# Patient Record
Sex: MALE | Race: WHITE | NOT HISPANIC OR LATINO | Employment: FULL TIME | URBAN - METROPOLITAN AREA
[De-identification: names, ages, dates, MRNs, and addresses within clinical notes are randomized per-mention and may not be internally consistent; named-entity substitution may affect disease eponyms.]

---

## 2023-10-12 DIAGNOSIS — F41.9 ANXIETY: Primary | ICD-10-CM

## 2023-10-12 RX ORDER — DULOXETIN HYDROCHLORIDE 60 MG/1
60 CAPSULE, DELAYED RELEASE ORAL DAILY
Qty: 90 CAPSULE | Refills: 1 | Status: SHIPPED | OUTPATIENT
Start: 2023-10-12 | End: 2024-04-02

## 2023-11-21 ENCOUNTER — TELEPHONE (OUTPATIENT)
Dept: PRIMARY CARE | Facility: CLINIC | Age: 63
End: 2023-11-21
Payer: COMMERCIAL

## 2023-12-05 DIAGNOSIS — I10 PRIMARY HYPERTENSION: ICD-10-CM

## 2023-12-05 DIAGNOSIS — Z13.5 DIABETIC RETINOPATHY SCREENING: Primary | ICD-10-CM

## 2023-12-05 DIAGNOSIS — M54.9 BACK PAIN, UNSPECIFIED BACK LOCATION, UNSPECIFIED BACK PAIN LATERALITY, UNSPECIFIED CHRONICITY: ICD-10-CM

## 2023-12-05 DIAGNOSIS — E78.2 MIXED HYPERLIPIDEMIA: ICD-10-CM

## 2023-12-05 RX ORDER — LISINOPRIL 40 MG/1
1 TABLET ORAL DAILY
COMMUNITY
Start: 2021-06-21 | End: 2023-12-05 | Stop reason: SDUPTHER

## 2023-12-05 RX ORDER — TIZANIDINE 4 MG/1
4 TABLET ORAL DAILY PRN
Qty: 90 TABLET | Refills: 1 | Status: SHIPPED | OUTPATIENT
Start: 2023-12-05 | End: 2024-06-02

## 2023-12-05 RX ORDER — LISINOPRIL 40 MG/1
40 TABLET ORAL DAILY
Qty: 90 TABLET | Refills: 1 | Status: SHIPPED | OUTPATIENT
Start: 2023-12-05 | End: 2024-05-17

## 2023-12-05 RX ORDER — GABAPENTIN 600 MG/1
1 TABLET ORAL 3 TIMES DAILY
COMMUNITY
Start: 2021-01-18 | End: 2023-12-05 | Stop reason: SDUPTHER

## 2023-12-05 RX ORDER — CHLORTHALIDONE 50 MG/1
50 TABLET ORAL DAILY
COMMUNITY
Start: 2022-07-13 | End: 2023-12-05 | Stop reason: SDUPTHER

## 2023-12-05 RX ORDER — TIZANIDINE 4 MG/1
2 TABLET ORAL DAILY PRN
COMMUNITY
Start: 2021-01-18 | End: 2023-12-05 | Stop reason: SDUPTHER

## 2023-12-05 RX ORDER — PRAVASTATIN SODIUM 20 MG/1
1 TABLET ORAL NIGHTLY
COMMUNITY
Start: 2021-07-15 | End: 2023-12-05 | Stop reason: SDUPTHER

## 2023-12-05 RX ORDER — CARVEDILOL 12.5 MG/1
1 TABLET ORAL
COMMUNITY
Start: 2021-10-20 | End: 2023-12-05 | Stop reason: SDUPTHER

## 2023-12-05 RX ORDER — GABAPENTIN 600 MG/1
600 TABLET ORAL 3 TIMES DAILY
Qty: 270 TABLET | Refills: 1 | Status: SHIPPED | OUTPATIENT
Start: 2023-12-05 | End: 2024-05-17

## 2023-12-05 RX ORDER — PRAVASTATIN SODIUM 20 MG/1
20 TABLET ORAL NIGHTLY
Qty: 90 TABLET | Refills: 1 | Status: SHIPPED | OUTPATIENT
Start: 2023-12-05 | End: 2023-12-19 | Stop reason: SDUPTHER

## 2023-12-05 RX ORDER — CHLORTHALIDONE 50 MG/1
50 TABLET ORAL DAILY
Qty: 90 TABLET | Refills: 1 | Status: SHIPPED | OUTPATIENT
Start: 2023-12-05 | End: 2024-05-17

## 2023-12-05 RX ORDER — CARVEDILOL 12.5 MG/1
12.5 TABLET ORAL
Qty: 180 TABLET | Refills: 1 | Status: SHIPPED | OUTPATIENT
Start: 2023-12-05 | End: 2024-05-17

## 2023-12-15 PROBLEM — F41.8 DEPRESSION WITH ANXIETY: Status: ACTIVE | Noted: 2023-12-15

## 2023-12-15 PROBLEM — R17 ELEVATED BILIRUBIN: Status: ACTIVE | Noted: 2023-12-15

## 2023-12-15 PROBLEM — R73.09 IMPAIRED GLUCOSE METABOLISM: Status: ACTIVE | Noted: 2023-12-15

## 2023-12-15 PROBLEM — M54.16 LUMBAR RADICULOPATHY, CHRONIC: Status: ACTIVE | Noted: 2023-12-15

## 2023-12-15 PROBLEM — L23.9 ALLERGIC CONTACT DERMATITIS: Status: ACTIVE | Noted: 2023-12-15

## 2023-12-15 PROBLEM — R35.1 NOCTURIA: Status: ACTIVE | Noted: 2023-12-15

## 2023-12-15 PROBLEM — T78.40XA ALLERGIES: Status: ACTIVE | Noted: 2023-12-15

## 2023-12-15 PROBLEM — I10 HTN (HYPERTENSION): Status: ACTIVE | Noted: 2023-12-15

## 2023-12-15 PROBLEM — E66.9 OBESE: Status: ACTIVE | Noted: 2023-12-15

## 2023-12-15 PROBLEM — E78.5 HYPERLIPIDEMIA: Status: ACTIVE | Noted: 2023-12-15

## 2023-12-15 PROBLEM — K21.9 GERD (GASTROESOPHAGEAL REFLUX DISEASE): Status: ACTIVE | Noted: 2023-12-15

## 2023-12-15 PROBLEM — M79.673 FOOT PAIN: Status: ACTIVE | Noted: 2023-12-15

## 2023-12-15 PROBLEM — R73.9 HYPERGLYCEMIA: Status: ACTIVE | Noted: 2023-12-15

## 2023-12-15 PROBLEM — K76.0 FATTY LIVER: Status: ACTIVE | Noted: 2023-12-15

## 2023-12-15 PROBLEM — R53.82 CHRONIC FATIGUE: Status: ACTIVE | Noted: 2023-12-15

## 2023-12-19 ENCOUNTER — OFFICE VISIT (OUTPATIENT)
Dept: PRIMARY CARE | Facility: CLINIC | Age: 63
End: 2023-12-19
Payer: COMMERCIAL

## 2023-12-19 VITALS
OXYGEN SATURATION: 96 % | HEIGHT: 75 IN | SYSTOLIC BLOOD PRESSURE: 118 MMHG | HEART RATE: 55 BPM | DIASTOLIC BLOOD PRESSURE: 70 MMHG | BODY MASS INDEX: 35.61 KG/M2 | WEIGHT: 286.4 LBS

## 2023-12-19 DIAGNOSIS — E55.9 VITAMIN D DEFICIENCY: ICD-10-CM

## 2023-12-19 DIAGNOSIS — I10 PRIMARY HYPERTENSION: Primary | ICD-10-CM

## 2023-12-19 DIAGNOSIS — E78.2 MIXED HYPERLIPIDEMIA: ICD-10-CM

## 2023-12-19 DIAGNOSIS — Z12.5 SCREENING FOR PROSTATE CANCER: ICD-10-CM

## 2023-12-19 DIAGNOSIS — R73.09 IMPAIRED GLUCOSE METABOLISM: ICD-10-CM

## 2023-12-19 PROBLEM — M51.36 DDD (DEGENERATIVE DISC DISEASE), LUMBAR: Status: ACTIVE | Noted: 2017-08-09

## 2023-12-19 PROBLEM — M51.16 INTERVERTEBRAL DISC DISORDER WITH RADICULOPATHY OF LUMBAR REGION: Status: ACTIVE | Noted: 2017-08-09

## 2023-12-19 PROBLEM — K64.9 HEMORRHOIDS: Status: ACTIVE | Noted: 2023-12-19

## 2023-12-19 PROBLEM — R79.89 ABNORMAL LIVER FUNCTION TESTS: Status: ACTIVE | Noted: 2023-12-19

## 2023-12-19 PROBLEM — M51.369 DDD (DEGENERATIVE DISC DISEASE), LUMBAR: Status: ACTIVE | Noted: 2017-08-09

## 2023-12-19 PROCEDURE — 3078F DIAST BP <80 MM HG: CPT | Performed by: STUDENT IN AN ORGANIZED HEALTH CARE EDUCATION/TRAINING PROGRAM

## 2023-12-19 PROCEDURE — 1036F TOBACCO NON-USER: CPT | Performed by: STUDENT IN AN ORGANIZED HEALTH CARE EDUCATION/TRAINING PROGRAM

## 2023-12-19 PROCEDURE — 3074F SYST BP LT 130 MM HG: CPT | Performed by: STUDENT IN AN ORGANIZED HEALTH CARE EDUCATION/TRAINING PROGRAM

## 2023-12-19 PROCEDURE — 99214 OFFICE O/P EST MOD 30 MIN: CPT | Performed by: STUDENT IN AN ORGANIZED HEALTH CARE EDUCATION/TRAINING PROGRAM

## 2023-12-19 RX ORDER — CETIRIZINE HYDROCHLORIDE 10 MG/1
1 TABLET ORAL DAILY
COMMUNITY
Start: 2021-10-20

## 2023-12-19 RX ORDER — ASPIRIN 81 MG/1
1 TABLET ORAL DAILY
COMMUNITY
Start: 2021-12-27

## 2023-12-19 RX ORDER — PRAVASTATIN SODIUM 20 MG/1
20 TABLET ORAL NIGHTLY
Qty: 90 TABLET | Refills: 1 | Status: SHIPPED | OUTPATIENT
Start: 2023-12-19 | End: 2024-04-04

## 2023-12-19 RX ORDER — BETAMETHASONE VALERATE 1 MG/G
CREAM TOPICAL 2 TIMES DAILY
COMMUNITY
Start: 2022-08-03

## 2023-12-19 ASSESSMENT — ENCOUNTER SYMPTOMS
SHORTNESS OF BREATH: 0
NAUSEA: 0
CONSTIPATION: 0
WHEEZING: 0
VOMITING: 0
DIZZINESS: 0
DYSURIA: 0
CHILLS: 0
PALPITATIONS: 0
JOINT SWELLING: 0
ABDOMINAL PAIN: 0
RHINORRHEA: 0
ARTHRALGIAS: 0
COUGH: 0
FEVER: 0
DIARRHEA: 0
FATIGUE: 0
FACIAL SWELLING: 0
SORE THROAT: 0
FREQUENCY: 0

## 2023-12-19 NOTE — PROGRESS NOTES
"Subjective   Patient ID: John Rojas is a 63 y.o. male who presents for Follow-up.    HPI   Patient is here for follow-up of hypertension, hyperlipidemia, impaired glucose metabolism, depression anxiety.  Overall is been feeling quite well.  Blood pressure well-controlled today.  Denies any chest pain, shortness breath, headaches.  Denies any adverse reaction with his medication regimen.  Mood has been stable.  Denies any thoughts of hurting self or others.  Due for blood work.  Needs refills of medications.  No other concerns today.    Review of Systems   Constitutional:  Negative for chills, fatigue and fever.   HENT:  Negative for congestion, ear pain, facial swelling, hearing loss, rhinorrhea and sore throat.    Respiratory:  Negative for cough, shortness of breath and wheezing.    Cardiovascular:  Negative for chest pain and palpitations.   Gastrointestinal:  Negative for abdominal pain, constipation, diarrhea, nausea and vomiting.   Genitourinary:  Negative for dysuria and frequency.   Musculoskeletal:  Negative for arthralgias and joint swelling.   Skin:  Negative for rash.   Neurological:  Negative for dizziness and syncope.       Objective   /70   Pulse 55   Ht 1.905 m (6' 3\")   Wt 130 kg (286 lb 6.4 oz)   SpO2 96%   BMI 35.80 kg/m²     Physical Exam  HENT:      Head: Normocephalic and atraumatic.      Right Ear: Tympanic membrane, ear canal and external ear normal.      Left Ear: Tympanic membrane, ear canal and external ear normal.      Nose: Nose normal.      Mouth/Throat:      Mouth: Mucous membranes are moist.      Pharynx: Oropharynx is clear. No posterior oropharyngeal erythema.   Eyes:      Conjunctiva/sclera: Conjunctivae normal.   Cardiovascular:      Rate and Rhythm: Normal rate and regular rhythm.      Pulses: Normal pulses.   Pulmonary:      Effort: Pulmonary effort is normal.      Breath sounds: Normal breath sounds.   Abdominal:      General: Abdomen is flat.      Palpations: " Abdomen is soft.   Skin:     General: Skin is warm and dry.   Neurological:      General: No focal deficit present.      Mental Status: He is alert.   Psychiatric:         Mood and Affect: Mood normal.         Behavior: Behavior normal.         Thought Content: Thought content normal.         Judgment: Judgment normal.       Assessment/Plan   Continue current medication regimen.  No changes point in time.  Follow-up in 6 months for chronic condition review.  Blood work has been ordered.

## 2024-04-02 ENCOUNTER — OFFICE VISIT (OUTPATIENT)
Dept: PRIMARY CARE | Facility: CLINIC | Age: 64
End: 2024-04-02
Payer: COMMERCIAL

## 2024-04-02 VITALS
BODY MASS INDEX: 36.18 KG/M2 | WEIGHT: 291 LBS | SYSTOLIC BLOOD PRESSURE: 122 MMHG | HEART RATE: 64 BPM | OXYGEN SATURATION: 94 % | DIASTOLIC BLOOD PRESSURE: 84 MMHG | HEIGHT: 75 IN

## 2024-04-02 DIAGNOSIS — Z00.00 ANNUAL PHYSICAL EXAM: Primary | ICD-10-CM

## 2024-04-02 DIAGNOSIS — E66.01 MORBID OBESITY DUE TO EXCESS CALORIES (MULTI): ICD-10-CM

## 2024-04-02 DIAGNOSIS — F41.9 ANXIETY: ICD-10-CM

## 2024-04-02 DIAGNOSIS — Z12.5 SCREENING FOR PROSTATE CANCER: ICD-10-CM

## 2024-04-02 DIAGNOSIS — I10 PRIMARY HYPERTENSION: ICD-10-CM

## 2024-04-02 DIAGNOSIS — E78.2 MIXED HYPERLIPIDEMIA: ICD-10-CM

## 2024-04-02 DIAGNOSIS — Z23 ENCOUNTER FOR IMMUNIZATION: ICD-10-CM

## 2024-04-02 PROCEDURE — G0442 ANNUAL ALCOHOL SCREEN 15 MIN: HCPCS | Performed by: STUDENT IN AN ORGANIZED HEALTH CARE EDUCATION/TRAINING PROGRAM

## 2024-04-02 PROCEDURE — 99396 PREV VISIT EST AGE 40-64: CPT | Performed by: STUDENT IN AN ORGANIZED HEALTH CARE EDUCATION/TRAINING PROGRAM

## 2024-04-02 PROCEDURE — 99214 OFFICE O/P EST MOD 30 MIN: CPT | Performed by: STUDENT IN AN ORGANIZED HEALTH CARE EDUCATION/TRAINING PROGRAM

## 2024-04-02 PROCEDURE — G0444 DEPRESSION SCREEN ANNUAL: HCPCS | Performed by: STUDENT IN AN ORGANIZED HEALTH CARE EDUCATION/TRAINING PROGRAM

## 2024-04-02 PROCEDURE — 3079F DIAST BP 80-89 MM HG: CPT | Performed by: STUDENT IN AN ORGANIZED HEALTH CARE EDUCATION/TRAINING PROGRAM

## 2024-04-02 PROCEDURE — 3074F SYST BP LT 130 MM HG: CPT | Performed by: STUDENT IN AN ORGANIZED HEALTH CARE EDUCATION/TRAINING PROGRAM

## 2024-04-02 RX ORDER — DULOXETIN HYDROCHLORIDE 60 MG/1
60 CAPSULE, DELAYED RELEASE ORAL
Qty: 90 CAPSULE | Refills: 1 | Status: SHIPPED | OUTPATIENT
Start: 2024-04-02 | End: 2024-09-29

## 2024-04-02 RX ORDER — DULOXETIN HYDROCHLORIDE 30 MG/1
30 CAPSULE, DELAYED RELEASE ORAL EVERY EVENING
Qty: 30 CAPSULE | Refills: 5 | Status: SHIPPED | OUTPATIENT
Start: 2024-04-02 | End: 2024-09-29

## 2024-04-02 NOTE — PROGRESS NOTES
Subjective   Patient ID: John Rojas is a 63 y.o. male who presents for the following    Assessment/Plan   Preventative Medicine  -UTD on flu vaccine and initial covid vaccine.   -Will give Tdap vaccine tomorrow.  -Colonoscopy: last done 10 years ago. Repeat recommended to be done in 2022, but pt didn't get it done. Will call Dr Peoples to schedule.   -PSA to be ordered   -Lipid panel ordered   -PHQ2: 0  -Alcohol screening done     #HTN  -BP stable  -Continue low salt diet   -Continue chlorthalidone and lisinopril     #HLD  -Continue pravastatin  -Lipid panel ordered  -Low fat diet   #Neuropathy  #Lumbar Radiculopathy    #YUDY/MDD  -States that he still sometimes feel like he has low mood.   -No SI/HI  PLAN  -Increase cymbalta to 60mg PO qAM and 30mg PO qPM        #NAFLD  -Confirmed by liver bx in 2021   -Low fat diet recommended   -Exercise as tolerated       HPI  63M presents for new patient evaluation.     He eats a relatively unhealthy diet. Eats a primarily meat based diet. Advised to incorporate veggies and fruits into his diet. Does not exercise due to chronic low back. Advised to stay active as much as possible.     Discussed MDD/YUDY. Cymbalta works, but he states that he still has some MDD/YUDY symptoms during the day. No SI/HI. POC discussed. Pt agreeable.     Has a hx of NAFLD and HLD. Advised to adhere to statin and attempt to decrease fatty food consumption. He will try.     BP is well controlled and he is compliant with home meds.     Denies fevers, chills, weight loss, lightheadedness, dizziness, vision changes, sore throat, runny nose, CP, SOB, cough, palpitations, n/v/d, abd pain, black/bloody stools, arthralgias, or new numbness/weakness/tingling in arms/legs/face.        PMH: HTN, YUDY/MDD, NAFLD, Neuropathy, chronic lumbar radiculopathy   Surgical Hx: CCY    Social  T: denies  A: denies  D: denies     Occupation  -Works at [employer]  -      Visit Vitals  /84   Pulse 64   Ht  "1.905 m (6' 3\")   Wt 132 kg (291 lb)   SpO2 94%   BMI 36.37 kg/m²   Smoking Status Never   BSA 2.64 m²     PHYSICAL EXAM   Physical Exam     Visit Vitals  /84   Pulse 64   Ht 1.905 m (6' 3\")   Wt 132 kg (291 lb)   SpO2 94%   BMI 36.37 kg/m²   Smoking Status Never   BSA 2.64 m²        General: NAD. NCAT. Aox3   HEENT: PERRLA. EOMI. MMM. Nares patent bl.  Cardiovascular: RRR. No MRG. S1/S2 wnl.   Respiratory: CTABL. No acute respiratory distress.   GI: Soft, NT abdomen.   MSK: ROM x 4. Chronic lumbar paraspinal tenderness.   Extremities: BLLE trace non-pitting edema. Cap refill < 2 sec.   Skin: No rashes or bruises.   Neuro: Aox3. Cranial Nerves grossly intact. Motor/sensory wnl. Chronic BLLE neuropathy.   Psych: Mood wnl.       REVIEW OF SYSTEMS   ROS In HPI     Allergies   Allergen Reactions   • Peanut Unknown       Current Outpatient Medications   Medication Sig Dispense Refill   • aspirin 81 mg EC tablet Take 1 tablet (81 mg) by mouth once daily.     • betamethasone valerate (Valisone) 0.1 % cream twice a day.     • carvedilol (Coreg) 12.5 mg tablet Take 1 tablet (12.5 mg) by mouth 2 times a day with meals. 180 tablet 1   • cetirizine (ZyrTEC) 10 mg tablet Take 1 tablet (10 mg) by mouth once daily.     • chlorthalidone (Hygroton) 50 mg tablet Take 1 tablet (50 mg) by mouth once daily. 90 tablet 1   • DULoxetine (Cymbalta) 60 mg DR capsule Take 1 capsule (60 mg) by mouth once daily. Do not crush or chew. 90 capsule 1   • gabapentin (Neurontin) 600 mg tablet Take 1 tablet (600 mg) by mouth 3 times a day. 270 tablet 1   • lisinopril 40 mg tablet Take 1 tablet (40 mg) by mouth once daily. 90 tablet 1   • multivitamin with minerals tablet Take 1 tablet by mouth once daily.     • pravastatin (Pravachol) 20 mg tablet Take 1 tablet (20 mg) by mouth once daily at bedtime. 90 tablet 1   • tiZANidine (Zanaflex) 4 mg tablet Take 1 tablet (4 mg) by mouth once daily as needed for muscle spasms. 90 tablet 1     No current " facility-administered medications for this visit.       Objective     No visits with results within 4 Month(s) from this visit.   Latest known visit with results is:   No results found for any previous visit.       Radiology: Reviewed imaging in powerchart.  No results found.    Family History   Problem Relation Name Age of Onset   • No Known Problems Mother       Social History     Socioeconomic History   • Marital status:      Spouse name: None   • Number of children: None   • Years of education: None   • Highest education level: None   Occupational History   • None   Tobacco Use   • Smoking status: Never     Passive exposure: Never   • Smokeless tobacco: Never   Vaping Use   • Vaping Use: Never used   Substance and Sexual Activity   • Alcohol use: Not Currently   • Drug use: Never   • Sexual activity: None   Other Topics Concern   • None   Social History Narrative   • None     Social Determinants of Health     Financial Resource Strain: Not on file   Food Insecurity: Not on file   Transportation Needs: Not on file   Physical Activity: Not on file   Stress: Not on file   Social Connections: Not on file   Intimate Partner Violence: Not on file   Housing Stability: Not on file     No past medical history on file.  Past Surgical History:   Procedure Laterality Date   • OTHER SURGICAL HISTORY  12/06/2021    Gallbladder surgery   • OTHER SURGICAL HISTORY  12/06/2021    Colonoscopy       Charting was completed using voice recognition technology and may include unintended errors.

## 2024-04-03 ENCOUNTER — LAB (OUTPATIENT)
Dept: LAB | Facility: LAB | Age: 64
End: 2024-04-03
Payer: COMMERCIAL

## 2024-04-03 DIAGNOSIS — E78.2 MIXED HYPERLIPIDEMIA: ICD-10-CM

## 2024-04-03 DIAGNOSIS — I10 PRIMARY HYPERTENSION: ICD-10-CM

## 2024-04-03 DIAGNOSIS — Z12.5 SCREENING FOR PROSTATE CANCER: ICD-10-CM

## 2024-04-03 DIAGNOSIS — F41.9 ANXIETY: ICD-10-CM

## 2024-04-03 DIAGNOSIS — E66.01 MORBID OBESITY DUE TO EXCESS CALORIES (MULTI): ICD-10-CM

## 2024-04-03 LAB
ALBUMIN SERPL BCP-MCNC: 4.6 G/DL (ref 3.4–5)
ALP SERPL-CCNC: 51 U/L (ref 33–136)
ALT SERPL W P-5'-P-CCNC: 85 U/L (ref 10–52)
ANION GAP SERPL CALC-SCNC: 13 MMOL/L (ref 10–20)
AST SERPL W P-5'-P-CCNC: 61 U/L (ref 9–39)
BASOPHILS # BLD AUTO: 0.04 X10*3/UL (ref 0–0.1)
BASOPHILS NFR BLD AUTO: 0.6 %
BILIRUB SERPL-MCNC: 1.3 MG/DL (ref 0–1.2)
BUN SERPL-MCNC: 23 MG/DL (ref 6–23)
CALCIUM SERPL-MCNC: 9.8 MG/DL (ref 8.6–10.6)
CHLORIDE SERPL-SCNC: 100 MMOL/L (ref 98–107)
CHOLEST SERPL-MCNC: 190 MG/DL (ref 0–199)
CHOLESTEROL/HDL RATIO: 5.3
CO2 SERPL-SCNC: 31 MMOL/L (ref 21–32)
CREAT SERPL-MCNC: 1.56 MG/DL (ref 0.5–1.3)
EGFRCR SERPLBLD CKD-EPI 2021: 50 ML/MIN/1.73M*2
EOSINOPHIL # BLD AUTO: 0.15 X10*3/UL (ref 0–0.7)
EOSINOPHIL NFR BLD AUTO: 2.3 %
ERYTHROCYTE [DISTWIDTH] IN BLOOD BY AUTOMATED COUNT: 12.8 % (ref 11.5–14.5)
GLUCOSE SERPL-MCNC: 155 MG/DL (ref 74–99)
HCT VFR BLD AUTO: 43.3 % (ref 41–52)
HDLC SERPL-MCNC: 35.9 MG/DL
HGB BLD-MCNC: 14.7 G/DL (ref 13.5–17.5)
IMM GRANULOCYTES # BLD AUTO: 0.01 X10*3/UL (ref 0–0.7)
IMM GRANULOCYTES NFR BLD AUTO: 0.2 % (ref 0–0.9)
LDLC SERPL CALC-MCNC: ABNORMAL MG/DL
LYMPHOCYTES # BLD AUTO: 3.16 X10*3/UL (ref 1.2–4.8)
LYMPHOCYTES NFR BLD AUTO: 48.5 %
MCH RBC QN AUTO: 28.7 PG (ref 26–34)
MCHC RBC AUTO-ENTMCNC: 33.9 G/DL (ref 32–36)
MCV RBC AUTO: 84 FL (ref 80–100)
MONOCYTES # BLD AUTO: 0.43 X10*3/UL (ref 0.1–1)
MONOCYTES NFR BLD AUTO: 6.6 %
NEUTROPHILS # BLD AUTO: 2.72 X10*3/UL (ref 1.2–7.7)
NEUTROPHILS NFR BLD AUTO: 41.8 %
NON HDL CHOLESTEROL: 154 MG/DL (ref 0–149)
NRBC BLD-RTO: 0 /100 WBCS (ref 0–0)
PLATELET # BLD AUTO: 211 X10*3/UL (ref 150–450)
POTASSIUM SERPL-SCNC: 3.6 MMOL/L (ref 3.5–5.3)
PROT SERPL-MCNC: 7.5 G/DL (ref 6.4–8.2)
PSA SERPL-MCNC: 0.49 NG/ML
RBC # BLD AUTO: 5.13 X10*6/UL (ref 4.5–5.9)
SODIUM SERPL-SCNC: 140 MMOL/L (ref 136–145)
TRIGL SERPL-MCNC: 456 MG/DL (ref 0–149)
TSH SERPL-ACNC: 2.93 MIU/L (ref 0.44–3.98)
VLDL: ABNORMAL
WBC # BLD AUTO: 6.5 X10*3/UL (ref 4.4–11.3)

## 2024-04-03 PROCEDURE — 80053 COMPREHEN METABOLIC PANEL: CPT

## 2024-04-03 PROCEDURE — 85025 COMPLETE CBC W/AUTO DIFF WBC: CPT

## 2024-04-03 PROCEDURE — 84443 ASSAY THYROID STIM HORMONE: CPT

## 2024-04-03 PROCEDURE — 36415 COLL VENOUS BLD VENIPUNCTURE: CPT

## 2024-04-03 PROCEDURE — 80061 LIPID PANEL: CPT

## 2024-04-03 PROCEDURE — 84153 ASSAY OF PSA TOTAL: CPT

## 2024-04-04 ENCOUNTER — TELEPHONE (OUTPATIENT)
Dept: PRIMARY CARE | Facility: CLINIC | Age: 64
End: 2024-04-04
Payer: COMMERCIAL

## 2024-04-04 RX ORDER — ATORVASTATIN CALCIUM 40 MG/1
40 TABLET, FILM COATED ORAL DAILY
Qty: 100 TABLET | Refills: 3 | Status: SHIPPED | OUTPATIENT
Start: 2024-04-04 | End: 2024-04-16

## 2024-04-04 NOTE — TELEPHONE ENCOUNTER
----- Message from Lori Haney MD sent at 4/4/2024 11:05 AM EDT -----  Has chronic kidney disease. Needs to control BP. Low salt diet recommended and avoid nephrotoxic medications such as ibuprofen, naproxen, full dose aspirin    Has Fatty liver disease. Labs seem stable compared to 2022.     Cholesterol is uncontrolled. Will discontinue pravastatin and start Atorvastatin 40mg PO daily. Needs to improve his diet by cutting down fats and carbs. Exercise at least 3x weekly.     Advise patient to have lipids rechecked with us in 6 months.

## 2024-04-10 NOTE — TELEPHONE ENCOUNTER
Spoke to pt - he was informed of results. There was no note stating this in chart. Patient will talk to provider on 4/16/24.

## 2024-04-16 ENCOUNTER — OFFICE VISIT (OUTPATIENT)
Dept: PRIMARY CARE | Facility: CLINIC | Age: 64
End: 2024-04-16
Payer: COMMERCIAL

## 2024-04-16 ENCOUNTER — LAB (OUTPATIENT)
Dept: LAB | Facility: LAB | Age: 64
End: 2024-04-16
Payer: COMMERCIAL

## 2024-04-16 VITALS
OXYGEN SATURATION: 94 % | HEART RATE: 62 BPM | WEIGHT: 284 LBS | HEIGHT: 75 IN | DIASTOLIC BLOOD PRESSURE: 82 MMHG | SYSTOLIC BLOOD PRESSURE: 124 MMHG | BODY MASS INDEX: 35.31 KG/M2

## 2024-04-16 DIAGNOSIS — E78.2 MIXED HYPERLIPIDEMIA: Primary | ICD-10-CM

## 2024-04-16 DIAGNOSIS — R73.9 HYPERGLYCEMIA: ICD-10-CM

## 2024-04-16 LAB
EST. AVERAGE GLUCOSE BLD GHB EST-MCNC: 160 MG/DL
HBA1C MFR BLD: 7.2 %

## 2024-04-16 PROCEDURE — 99214 OFFICE O/P EST MOD 30 MIN: CPT | Performed by: STUDENT IN AN ORGANIZED HEALTH CARE EDUCATION/TRAINING PROGRAM

## 2024-04-16 PROCEDURE — 1036F TOBACCO NON-USER: CPT | Performed by: STUDENT IN AN ORGANIZED HEALTH CARE EDUCATION/TRAINING PROGRAM

## 2024-04-16 PROCEDURE — 83036 HEMOGLOBIN GLYCOSYLATED A1C: CPT

## 2024-04-16 PROCEDURE — 3074F SYST BP LT 130 MM HG: CPT | Performed by: STUDENT IN AN ORGANIZED HEALTH CARE EDUCATION/TRAINING PROGRAM

## 2024-04-16 PROCEDURE — 3079F DIAST BP 80-89 MM HG: CPT | Performed by: STUDENT IN AN ORGANIZED HEALTH CARE EDUCATION/TRAINING PROGRAM

## 2024-04-16 PROCEDURE — 36415 COLL VENOUS BLD VENIPUNCTURE: CPT

## 2024-04-16 RX ORDER — ATORVASTATIN CALCIUM 80 MG/1
80 TABLET, FILM COATED ORAL DAILY
Qty: 100 TABLET | Refills: 3 | Status: SHIPPED | OUTPATIENT
Start: 2024-04-16 | End: 2025-05-21

## 2024-04-16 NOTE — PROGRESS NOTES
Subjective   Patient ID: John Rojas is a 63 y.o. male who presents for the follow up given recent establishment of care visit 4/2 to review baseline labs and efficacy of his medication regiment change.    Assessment/Plan     Preventative Medicine  -UTD on flu vaccine and initial covid vaccine.   -Will give Tdap vaccine tomorrow.  -Colonoscopy: last done 10 years ago. Repeat recommended to be done in 2022, but pt didn't get it done. Will call Dr ePoples to schedule.   -PSA reviewed 0.49  -PHQ2: 0  -Alcohol screening done     #Hyperglycemia   -Serum glucose on   -Ordered A1c to better assess for DM    #HTN, essential  -BP stable noting in clinic today 124/82  -Continue low salt diet   -Continue chlorthalidone and lisinopril     #HLD  -Discontinued pravastatin on 4/2/2024  -Initiated 40 mg atorvastatin but given patient's lipid panel results will increase his atorvastatin dosing to 80 mg daily.  -Low fat diet       #Neuropathy  #Lumbar Radiculopathy  -Stable     #YUDY/MDD  -States that he still sometimes feel like he has low mood.   -No SI/HI  PLAN  -Continue cymbalta 60mg PO qAM and 30mg PO qPM    #CKD stage IIIa  -sCr 1.5  -eGRR of 50  -Avoid nephrotoxic agents most concerning given patients complaint of chronic back pain educated on the avoidance of NSAIDs    #NAFLD  -Confirmed by liver bx in 2021   -Low fat diet recommended   -Exercise as tolerated       HPI  63-year-old male presenting in follow-up from an establishment of care visit to review baseline labs and assess his tolerance of recent medication changes noting an increase to Cymbalta and transition to atorvastatin discontinuing his chronic pravastatin.  Patient endorses improvement in his MDD symptoms with the increase in Cymbalta denies any HI or SI.  Denies any chest pain, worsening shortness of breath, cough, abdominal pain, nausea, vomiting, diarrhea, fevers or chills and endorses that he is tolerating his medications as prescribed without  "issue.      PMH: HTN, YUDY/MDD, NAFLD, Neuropathy, chronic lumbar radiculopathy   Surgical Hx: CCY    Social  T: denies  A: denies  D: denies     Occupation  -Works at motor repair shop  -      Visit Vitals  /82   Pulse 62   Ht 1.905 m (6' 3\")   Wt 129 kg (284 lb)   SpO2 94%   BMI 35.50 kg/m²   Smoking Status Never   BSA 2.61 m²     PHYSICAL EXAM   Physical Exam     Visit Vitals  /82   Pulse 62   Ht 1.905 m (6' 3\")   Wt 129 kg (284 lb)   SpO2 94%   BMI 35.50 kg/m²   Smoking Status Never   BSA 2.61 m²        General: NAD. NCAT. Aox3   HEENT: PERRLA. EOMI. MMM. Nares patent bl.  Cardiovascular: RRR. No MRG. S1/S2 wnl.   Respiratory: CTABL. No acute respiratory distress.   GI: Soft, NT abdomen.   MSK: ROM x 4. Chronic lumbar paraspinal tenderness.   Extremities: BLLE trace non-pitting edema. Cap refill < 2 sec.   Skin: No rashes or bruises.   Neuro: Aox3. Cranial Nerves grossly intact. Motor/sensory wnl. Chronic BLLE neuropathy.   Psych: Mood wnl.       REVIEW OF SYSTEMS   ROS In HPI     Allergies   Allergen Reactions    Peanut Unknown       Current Outpatient Medications   Medication Sig Dispense Refill    aspirin 81 mg EC tablet Take 1 tablet (81 mg) by mouth once daily.      atorvastatin (Lipitor) 40 mg tablet Take 1 tablet (40 mg) by mouth once daily. 100 tablet 3    betamethasone valerate (Valisone) 0.1 % cream twice a day.      carvedilol (Coreg) 12.5 mg tablet Take 1 tablet (12.5 mg) by mouth 2 times a day with meals. 180 tablet 1    cetirizine (ZyrTEC) 10 mg tablet Take 1 tablet (10 mg) by mouth once daily.      chlorthalidone (Hygroton) 50 mg tablet Take 1 tablet (50 mg) by mouth once daily. 90 tablet 1    DULoxetine (Cymbalta) 30 mg DR capsule Take 1 capsule (30 mg) by mouth once daily in the evening. Do not crush or chew. 30 capsule 5    DULoxetine (Cymbalta) 60 mg DR capsule Take 1 capsule (60 mg) by mouth once daily in the morning. Take before meals. Do not crush or chew. 90 capsule 1    " gabapentin (Neurontin) 600 mg tablet Take 1 tablet (600 mg) by mouth 3 times a day. 270 tablet 1    lisinopril 40 mg tablet Take 1 tablet (40 mg) by mouth once daily. 90 tablet 1    multivitamin with minerals tablet Take 1 tablet by mouth once daily.      tiZANidine (Zanaflex) 4 mg tablet Take 1 tablet (4 mg) by mouth once daily as needed for muscle spasms. 90 tablet 1     No current facility-administered medications for this visit.       Objective     Lab on 04/03/2024   Component Date Value Ref Range Status    Glucose 04/03/2024 155 (H)  74 - 99 mg/dL Final    Sodium 04/03/2024 140  136 - 145 mmol/L Final    Potassium 04/03/2024 3.6  3.5 - 5.3 mmol/L Final    Chloride 04/03/2024 100  98 - 107 mmol/L Final    Bicarbonate 04/03/2024 31  21 - 32 mmol/L Final    Anion Gap 04/03/2024 13  10 - 20 mmol/L Final    Urea Nitrogen 04/03/2024 23  6 - 23 mg/dL Final    Creatinine 04/03/2024 1.56 (H)  0.50 - 1.30 mg/dL Final    eGFR 04/03/2024 50 (L)  >60 mL/min/1.73m*2 Final    Calcium 04/03/2024 9.8  8.6 - 10.6 mg/dL Final    Albumin 04/03/2024 4.6  3.4 - 5.0 g/dL Final    Alkaline Phosphatase 04/03/2024 51  33 - 136 U/L Final    Total Protein 04/03/2024 7.5  6.4 - 8.2 g/dL Final    AST 04/03/2024 61 (H)  9 - 39 U/L Final    Bilirubin, Total 04/03/2024 1.3 (H)  0.0 - 1.2 mg/dL Final    ALT 04/03/2024 85 (H)  10 - 52 U/L Final    WBC 04/03/2024 6.5  4.4 - 11.3 x10*3/uL Final    nRBC 04/03/2024 0.0  0.0 - 0.0 /100 WBCs Final    RBC 04/03/2024 5.13  4.50 - 5.90 x10*6/uL Final    Hemoglobin 04/03/2024 14.7  13.5 - 17.5 g/dL Final    Hematocrit 04/03/2024 43.3  41.0 - 52.0 % Final    MCV 04/03/2024 84  80 - 100 fL Final    MCH 04/03/2024 28.7  26.0 - 34.0 pg Final    MCHC 04/03/2024 33.9  32.0 - 36.0 g/dL Final    RDW 04/03/2024 12.8  11.5 - 14.5 % Final    Platelets 04/03/2024 211  150 - 450 x10*3/uL Final    Neutrophils % 04/03/2024 41.8  40.0 - 80.0 % Final    Immature Granulocytes %, Automated 04/03/2024 0.2  0.0 - 0.9 % Final     Lymphocytes % 04/03/2024 48.5  13.0 - 44.0 % Final    Monocytes % 04/03/2024 6.6  2.0 - 10.0 % Final    Eosinophils % 04/03/2024 2.3  0.0 - 6.0 % Final    Basophils % 04/03/2024 0.6  0.0 - 2.0 % Final    Neutrophils Absolute 04/03/2024 2.72  1.20 - 7.70 x10*3/uL Final    Immature Granulocytes Absolute, Au* 04/03/2024 0.01  0.00 - 0.70 x10*3/uL Final    Lymphocytes Absolute 04/03/2024 3.16  1.20 - 4.80 x10*3/uL Final    Monocytes Absolute 04/03/2024 0.43  0.10 - 1.00 x10*3/uL Final    Eosinophils Absolute 04/03/2024 0.15  0.00 - 0.70 x10*3/uL Final    Basophils Absolute 04/03/2024 0.04  0.00 - 0.10 x10*3/uL Final    Cholesterol 04/03/2024 190  0 - 199 mg/dL Final    HDL-Cholesterol 04/03/2024 35.9  mg/dL Final    Cholesterol/HDL Ratio 04/03/2024 5.3   Final    LDL Calculated 04/03/2024    Final    VLDL 04/03/2024    Final    Triglycerides 04/03/2024 456 (H)  0 - 149 mg/dL Final    Non HDL Cholesterol 04/03/2024 154 (H)  0 - 149 mg/dL Final    Thyroid Stimulating Hormone 04/03/2024 2.93  0.44 - 3.98 mIU/L Final    Prostate Specific Antigen,Screen 04/03/2024 0.49  <=4.00 ng/mL Final       Radiology: Reviewed imaging in powerchart.  No results found.    Family History   Problem Relation Name Age of Onset    No Known Problems Mother       Social History     Socioeconomic History    Marital status:      Spouse name: None    Number of children: None    Years of education: None    Highest education level: None   Occupational History    None   Tobacco Use    Smoking status: Never     Passive exposure: Never    Smokeless tobacco: Never   Vaping Use    Vaping status: Never Used   Substance and Sexual Activity    Alcohol use: Not Currently    Drug use: Never    Sexual activity: None   Other Topics Concern    None   Social History Narrative    None     Social Determinants of Health     Financial Resource Strain: Not on file   Food Insecurity: Not on file   Transportation Needs: Not on file   Physical Activity: Not on  file   Stress: Not on file   Social Connections: Not on file   Intimate Partner Violence: Not on file   Housing Stability: Not on file     No past medical history on file.  Past Surgical History:   Procedure Laterality Date    OTHER SURGICAL HISTORY  12/06/2021    Gallbladder surgery    OTHER SURGICAL HISTORY  12/06/2021    Colonoscopy       Charting was completed using voice recognition technology and may include unintended errors.       Assessment and plan discussed with attending physician     Rehan Jimenes, DO  Internal Medicine, PGY-2

## 2024-04-17 ENCOUNTER — TELEPHONE (OUTPATIENT)
Dept: PRIMARY CARE | Facility: CLINIC | Age: 64
End: 2024-04-17
Payer: COMMERCIAL

## 2024-04-17 NOTE — TELEPHONE ENCOUNTER
----- Message from Lori Haney MD sent at 4/17/2024  8:55 AM EDT -----  A1c indicates patient is diabetic. Please set up phone call to go over starting medications within the next 1-2 weeks. Advise patient to decrease carbohydrate intake in the interim.

## 2024-04-19 NOTE — TELEPHONE ENCOUNTER
Lmtcb.   Patient is active on [employer]. Message sent for patient to respond and set up an appointment.

## 2024-04-23 ENCOUNTER — TELEMEDICINE (OUTPATIENT)
Dept: PRIMARY CARE | Facility: CLINIC | Age: 64
End: 2024-04-23
Payer: COMMERCIAL

## 2024-04-23 DIAGNOSIS — E11.9 NEW ONSET TYPE 2 DIABETES MELLITUS (MULTI): Primary | ICD-10-CM

## 2024-04-23 DIAGNOSIS — E78.2 MIXED HYPERLIPIDEMIA: ICD-10-CM

## 2024-04-23 PROCEDURE — 3051F HG A1C>EQUAL 7.0%<8.0%: CPT | Performed by: STUDENT IN AN ORGANIZED HEALTH CARE EDUCATION/TRAINING PROGRAM

## 2024-04-23 PROCEDURE — 1036F TOBACCO NON-USER: CPT | Performed by: STUDENT IN AN ORGANIZED HEALTH CARE EDUCATION/TRAINING PROGRAM

## 2024-04-23 PROCEDURE — 4010F ACE/ARB THERAPY RXD/TAKEN: CPT | Performed by: STUDENT IN AN ORGANIZED HEALTH CARE EDUCATION/TRAINING PROGRAM

## 2024-04-23 PROCEDURE — 99213 OFFICE O/P EST LOW 20 MIN: CPT | Performed by: STUDENT IN AN ORGANIZED HEALTH CARE EDUCATION/TRAINING PROGRAM

## 2024-04-23 RX ORDER — BLOOD-GLUCOSE SENSOR
EACH MISCELLANEOUS
Qty: 2 EACH | Refills: 3 | Status: SHIPPED | OUTPATIENT
Start: 2024-04-23

## 2024-04-23 NOTE — PROGRESS NOTES
Subjective   Patient ID: John Rojas is a 63 y.o. male who presents for the follow up    Assessment/Plan   #New on Set DM  -A1c: 7.2%  -Discussed various options with patient. Diabetic education given  PLAN  -Start Ozempic 0.25mg subcutaneous once weekly. No fhx of MEN. Side effect profile discussed  -Follow up in 1 month  -Free style juli rx given   -low carb diet advised  -Will need diabetic foot, eye exams.     #HLD  -Discontinued pravastatin on 4/2/2024  -Atorvastatin 80mg   -Low fat diet       HPI    Virtual or Telephone Consent    An interactive audio and video telecommunication system which permits real time communications between the patient (at the originating site) and provider (at the distant site) was utilized to provide this telehealth service.   Verbal consent was requested and obtained from John Rojas on this date, 04/23/24 for a telehealth visit.    63-year-old male presenting in follow-up after labs showed new dx of DM. Discussed disease course and provided diabetic counseling. Medication options discussed. Will start ozempic. Side effect profile discussed. No fhx of thyroid ca. Freestyle juli 3 rx given.       Denies any chest pain, worsening shortness of breath, cough, abdominal pain, nausea, vomiting, diarrhea, fevers or chills and endorses that he is tolerating his medications as prescribed without issue.      PMH: HTN, YUDY/MDD, NAFLD, Neuropathy, chronic lumbar radiculopathy   Surgical Hx: CCY    No family hx of thyroid cancer    Social  T: denies  A: denies  D: denies     Occupation  -Works at motor repair shop  -      Visit Vitals  Smoking Status Never     PHYSICAL EXAM   Physical Exam     Visit Vitals  Smoking Status Never      Deferred      REVIEW OF SYSTEMS   ROS In HPI     Allergies   Allergen Reactions    Peanut Unknown       Current Outpatient Medications   Medication Sig Dispense Refill    aspirin 81 mg EC tablet Take 1 tablet (81 mg) by mouth once daily.      atorvastatin  (Lipitor) 80 mg tablet Take 1 tablet (80 mg) by mouth once daily. 100 tablet 3    betamethasone valerate (Valisone) 0.1 % cream twice a day.      carvedilol (Coreg) 12.5 mg tablet Take 1 tablet (12.5 mg) by mouth 2 times a day with meals. 180 tablet 1    cetirizine (ZyrTEC) 10 mg tablet Take 1 tablet (10 mg) by mouth once daily.      chlorthalidone (Hygroton) 50 mg tablet Take 1 tablet (50 mg) by mouth once daily. 90 tablet 1    DULoxetine (Cymbalta) 30 mg DR capsule Take 1 capsule (30 mg) by mouth once daily in the evening. Do not crush or chew. 30 capsule 5    DULoxetine (Cymbalta) 60 mg DR capsule Take 1 capsule (60 mg) by mouth once daily in the morning. Take before meals. Do not crush or chew. 90 capsule 1    gabapentin (Neurontin) 600 mg tablet Take 1 tablet (600 mg) by mouth 3 times a day. 270 tablet 1    lisinopril 40 mg tablet Take 1 tablet (40 mg) by mouth once daily. 90 tablet 1    multivitamin with minerals tablet Take 1 tablet by mouth once daily.      tiZANidine (Zanaflex) 4 mg tablet Take 1 tablet (4 mg) by mouth once daily as needed for muscle spasms. 90 tablet 1     No current facility-administered medications for this visit.       Objective     Lab on 04/16/2024   Component Date Value Ref Range Status    Hemoglobin A1C 04/16/2024 7.2 (H)  see below % Final    Estimated Average Glucose 04/16/2024 160  Not Established mg/dL Final   Lab on 04/03/2024   Component Date Value Ref Range Status    Glucose 04/03/2024 155 (H)  74 - 99 mg/dL Final    Sodium 04/03/2024 140  136 - 145 mmol/L Final    Potassium 04/03/2024 3.6  3.5 - 5.3 mmol/L Final    Chloride 04/03/2024 100  98 - 107 mmol/L Final    Bicarbonate 04/03/2024 31  21 - 32 mmol/L Final    Anion Gap 04/03/2024 13  10 - 20 mmol/L Final    Urea Nitrogen 04/03/2024 23  6 - 23 mg/dL Final    Creatinine 04/03/2024 1.56 (H)  0.50 - 1.30 mg/dL Final    eGFR 04/03/2024 50 (L)  >60 mL/min/1.73m*2 Final    Calcium 04/03/2024 9.8  8.6 - 10.6 mg/dL Final     Albumin 04/03/2024 4.6  3.4 - 5.0 g/dL Final    Alkaline Phosphatase 04/03/2024 51  33 - 136 U/L Final    Total Protein 04/03/2024 7.5  6.4 - 8.2 g/dL Final    AST 04/03/2024 61 (H)  9 - 39 U/L Final    Bilirubin, Total 04/03/2024 1.3 (H)  0.0 - 1.2 mg/dL Final    ALT 04/03/2024 85 (H)  10 - 52 U/L Final    WBC 04/03/2024 6.5  4.4 - 11.3 x10*3/uL Final    nRBC 04/03/2024 0.0  0.0 - 0.0 /100 WBCs Final    RBC 04/03/2024 5.13  4.50 - 5.90 x10*6/uL Final    Hemoglobin 04/03/2024 14.7  13.5 - 17.5 g/dL Final    Hematocrit 04/03/2024 43.3  41.0 - 52.0 % Final    MCV 04/03/2024 84  80 - 100 fL Final    MCH 04/03/2024 28.7  26.0 - 34.0 pg Final    MCHC 04/03/2024 33.9  32.0 - 36.0 g/dL Final    RDW 04/03/2024 12.8  11.5 - 14.5 % Final    Platelets 04/03/2024 211  150 - 450 x10*3/uL Final    Neutrophils % 04/03/2024 41.8  40.0 - 80.0 % Final    Immature Granulocytes %, Automated 04/03/2024 0.2  0.0 - 0.9 % Final    Lymphocytes % 04/03/2024 48.5  13.0 - 44.0 % Final    Monocytes % 04/03/2024 6.6  2.0 - 10.0 % Final    Eosinophils % 04/03/2024 2.3  0.0 - 6.0 % Final    Basophils % 04/03/2024 0.6  0.0 - 2.0 % Final    Neutrophils Absolute 04/03/2024 2.72  1.20 - 7.70 x10*3/uL Final    Immature Granulocytes Absolute, Au* 04/03/2024 0.01  0.00 - 0.70 x10*3/uL Final    Lymphocytes Absolute 04/03/2024 3.16  1.20 - 4.80 x10*3/uL Final    Monocytes Absolute 04/03/2024 0.43  0.10 - 1.00 x10*3/uL Final    Eosinophils Absolute 04/03/2024 0.15  0.00 - 0.70 x10*3/uL Final    Basophils Absolute 04/03/2024 0.04  0.00 - 0.10 x10*3/uL Final    Cholesterol 04/03/2024 190  0 - 199 mg/dL Final    HDL-Cholesterol 04/03/2024 35.9  mg/dL Final    Cholesterol/HDL Ratio 04/03/2024 5.3   Final    LDL Calculated 04/03/2024    Final    VLDL 04/03/2024    Final    Triglycerides 04/03/2024 456 (H)  0 - 149 mg/dL Final    Non HDL Cholesterol 04/03/2024 154 (H)  0 - 149 mg/dL Final    Thyroid Stimulating Hormone 04/03/2024 2.93  0.44 - 3.98 mIU/L Final     Prostate Specific Antigen,Screen 04/03/2024 0.49  <=4.00 ng/mL Final       Radiology: Reviewed imaging in powerchart.  No results found.    Family History   Problem Relation Name Age of Onset    No Known Problems Mother       Social History     Socioeconomic History    Marital status:      Spouse name: None    Number of children: None    Years of education: None    Highest education level: None   Occupational History    None   Tobacco Use    Smoking status: Never     Passive exposure: Never    Smokeless tobacco: Never   Vaping Use    Vaping status: Never Used   Substance and Sexual Activity    Alcohol use: Not Currently    Drug use: Never    Sexual activity: None   Other Topics Concern    None   Social History Narrative    None     Social Determinants of Health     Financial Resource Strain: Not on file   Food Insecurity: Not on file   Transportation Needs: Not on file   Physical Activity: Not on file   Stress: Not on file   Social Connections: Not on file   Intimate Partner Violence: Not on file   Housing Stability: Not on file     No past medical history on file.  Past Surgical History:   Procedure Laterality Date    OTHER SURGICAL HISTORY  12/06/2021    Gallbladder surgery    OTHER SURGICAL HISTORY  12/06/2021    Colonoscopy       Charting was completed using voice recognition technology and may include unintended errors.       Assessment and plan discussed with attending physician     Rehan Jimenes, DO  Internal Medicine, PGY-2

## 2024-05-15 ENCOUNTER — TELEPHONE (OUTPATIENT)
Dept: PRIMARY CARE | Facility: CLINIC | Age: 64
End: 2024-05-15
Payer: COMMERCIAL

## 2024-05-16 DIAGNOSIS — M54.9 BACK PAIN, UNSPECIFIED BACK LOCATION, UNSPECIFIED BACK PAIN LATERALITY, UNSPECIFIED CHRONICITY: ICD-10-CM

## 2024-05-16 DIAGNOSIS — I10 PRIMARY HYPERTENSION: ICD-10-CM

## 2024-05-17 RX ORDER — CARVEDILOL 12.5 MG/1
12.5 TABLET ORAL
Qty: 180 TABLET | Refills: 1 | Status: SHIPPED | OUTPATIENT
Start: 2024-05-17

## 2024-05-17 RX ORDER — CHLORTHALIDONE 50 MG/1
50 TABLET ORAL DAILY
Qty: 90 TABLET | Refills: 1 | Status: SHIPPED | OUTPATIENT
Start: 2024-05-17

## 2024-05-17 RX ORDER — LISINOPRIL 40 MG/1
40 TABLET ORAL DAILY
Qty: 90 TABLET | Refills: 1 | Status: SHIPPED | OUTPATIENT
Start: 2024-05-17

## 2024-05-17 RX ORDER — GABAPENTIN 600 MG/1
600 TABLET ORAL 3 TIMES DAILY
Qty: 270 TABLET | Refills: 1 | Status: SHIPPED | OUTPATIENT
Start: 2024-05-17

## 2024-06-30 DIAGNOSIS — E11.9 NEW ONSET TYPE 2 DIABETES MELLITUS (MULTI): ICD-10-CM

## 2024-07-01 RX ORDER — SEMAGLUTIDE 0.68 MG/ML
INJECTION, SOLUTION SUBCUTANEOUS
Qty: 3 ML | Refills: 0 | Status: SHIPPED | OUTPATIENT
Start: 2024-07-01

## 2024-07-07 ENCOUNTER — PATIENT MESSAGE (OUTPATIENT)
Dept: PRIMARY CARE | Facility: CLINIC | Age: 64
End: 2024-07-07
Payer: COMMERCIAL

## 2024-07-07 DIAGNOSIS — I10 PRIMARY HYPERTENSION: ICD-10-CM

## 2024-07-07 DIAGNOSIS — M54.9 BACK PAIN, UNSPECIFIED BACK LOCATION, UNSPECIFIED BACK PAIN LATERALITY, UNSPECIFIED CHRONICITY: ICD-10-CM

## 2024-07-07 DIAGNOSIS — F41.9 ANXIETY: ICD-10-CM

## 2024-07-07 DIAGNOSIS — E78.2 MIXED HYPERLIPIDEMIA: ICD-10-CM

## 2024-07-08 NOTE — TELEPHONE ENCOUNTER
From: John Rojas  To: Lori Haney  Sent: 7/7/2024 12:45 PM EDT  Subject: medication    Dear Dr. Haney,   I need all my Prescriptions refilled. If you could please send them to [facility], It would be greatly appreciated..  Thank You,  John Rojas  7/07/2024

## 2024-07-09 RX ORDER — ATORVASTATIN CALCIUM 80 MG/1
80 TABLET, FILM COATED ORAL DAILY
Qty: 100 TABLET | Refills: 3 | Status: SHIPPED | OUTPATIENT
Start: 2024-07-09 | End: 2025-08-13

## 2024-07-09 RX ORDER — GABAPENTIN 600 MG/1
600 TABLET ORAL 3 TIMES DAILY
Qty: 270 TABLET | Refills: 1 | Status: SHIPPED | OUTPATIENT
Start: 2024-07-09

## 2024-07-09 RX ORDER — CARVEDILOL 12.5 MG/1
12.5 TABLET ORAL
Qty: 180 TABLET | Refills: 1 | Status: SHIPPED | OUTPATIENT
Start: 2024-07-09

## 2024-07-09 RX ORDER — TIZANIDINE 4 MG/1
4 TABLET ORAL DAILY PRN
Qty: 90 TABLET | Refills: 1 | Status: SHIPPED | OUTPATIENT
Start: 2024-07-09 | End: 2025-01-05

## 2024-07-09 RX ORDER — LISINOPRIL 40 MG/1
40 TABLET ORAL DAILY
Qty: 90 TABLET | Refills: 1 | Status: SHIPPED | OUTPATIENT
Start: 2024-07-09

## 2024-07-09 RX ORDER — DULOXETIN HYDROCHLORIDE 60 MG/1
60 CAPSULE, DELAYED RELEASE ORAL
Qty: 90 CAPSULE | Refills: 1 | Status: SHIPPED | OUTPATIENT
Start: 2024-07-09 | End: 2025-01-05

## 2024-07-09 RX ORDER — CHLORTHALIDONE 50 MG/1
50 TABLET ORAL DAILY
Qty: 90 TABLET | Refills: 1 | Status: SHIPPED | OUTPATIENT
Start: 2024-07-09

## 2024-07-09 RX ORDER — DULOXETIN HYDROCHLORIDE 30 MG/1
30 CAPSULE, DELAYED RELEASE ORAL EVERY EVENING
Qty: 30 CAPSULE | Refills: 5 | Status: SHIPPED | OUTPATIENT
Start: 2024-07-09 | End: 2025-01-05

## 2024-09-24 ENCOUNTER — APPOINTMENT (OUTPATIENT)
Dept: PRIMARY CARE | Facility: CLINIC | Age: 64
End: 2024-09-24
Payer: COMMERCIAL

## 2024-09-24 ENCOUNTER — LAB (OUTPATIENT)
Dept: LAB | Facility: LAB | Age: 64
End: 2024-09-24
Payer: COMMERCIAL

## 2024-09-24 VITALS
DIASTOLIC BLOOD PRESSURE: 76 MMHG | HEART RATE: 57 BPM | HEIGHT: 75 IN | WEIGHT: 276 LBS | SYSTOLIC BLOOD PRESSURE: 116 MMHG | BODY MASS INDEX: 34.32 KG/M2

## 2024-09-24 DIAGNOSIS — Z79.899 MEDICATION MANAGEMENT: ICD-10-CM

## 2024-09-24 DIAGNOSIS — M54.9 BACK PAIN, UNSPECIFIED BACK LOCATION, UNSPECIFIED BACK PAIN LATERALITY, UNSPECIFIED CHRONICITY: ICD-10-CM

## 2024-09-24 DIAGNOSIS — F41.9 ANXIETY: ICD-10-CM

## 2024-09-24 DIAGNOSIS — G47.33 OBSTRUCTIVE SLEEP APNEA: Primary | ICD-10-CM

## 2024-09-24 DIAGNOSIS — E11.9 NEW ONSET TYPE 2 DIABETES MELLITUS (MULTI): ICD-10-CM

## 2024-09-24 DIAGNOSIS — E78.2 MIXED HYPERLIPIDEMIA: ICD-10-CM

## 2024-09-24 DIAGNOSIS — I10 PRIMARY HYPERTENSION: ICD-10-CM

## 2024-09-24 LAB
ALBUMIN SERPL BCP-MCNC: 4.6 G/DL (ref 3.4–5)
ANION GAP SERPL CALC-SCNC: 14 MMOL/L (ref 10–20)
BUN SERPL-MCNC: 23 MG/DL (ref 6–23)
CALCIUM SERPL-MCNC: 9.6 MG/DL (ref 8.6–10.6)
CHLORIDE SERPL-SCNC: 102 MMOL/L (ref 98–107)
CO2 SERPL-SCNC: 28 MMOL/L (ref 21–32)
CREAT SERPL-MCNC: 1.42 MG/DL (ref 0.5–1.3)
EGFRCR SERPLBLD CKD-EPI 2021: 56 ML/MIN/1.73M*2
GLUCOSE SERPL-MCNC: 109 MG/DL (ref 74–99)
PHOSPHATE SERPL-MCNC: 4 MG/DL (ref 2.5–4.9)
POC HEMOGLOBIN A1C: 6.7 % (ref 4.2–6.5)
POTASSIUM SERPL-SCNC: 3.6 MMOL/L (ref 3.5–5.3)
SODIUM SERPL-SCNC: 140 MMOL/L (ref 136–145)

## 2024-09-24 PROCEDURE — 99214 OFFICE O/P EST MOD 30 MIN: CPT | Performed by: STUDENT IN AN ORGANIZED HEALTH CARE EDUCATION/TRAINING PROGRAM

## 2024-09-24 PROCEDURE — 36415 COLL VENOUS BLD VENIPUNCTURE: CPT

## 2024-09-24 PROCEDURE — 1036F TOBACCO NON-USER: CPT | Performed by: STUDENT IN AN ORGANIZED HEALTH CARE EDUCATION/TRAINING PROGRAM

## 2024-09-24 PROCEDURE — 3051F HG A1C>EQUAL 7.0%<8.0%: CPT | Performed by: STUDENT IN AN ORGANIZED HEALTH CARE EDUCATION/TRAINING PROGRAM

## 2024-09-24 PROCEDURE — 80069 RENAL FUNCTION PANEL: CPT

## 2024-09-24 PROCEDURE — 4010F ACE/ARB THERAPY RXD/TAKEN: CPT | Performed by: STUDENT IN AN ORGANIZED HEALTH CARE EDUCATION/TRAINING PROGRAM

## 2024-09-24 PROCEDURE — 3074F SYST BP LT 130 MM HG: CPT | Performed by: STUDENT IN AN ORGANIZED HEALTH CARE EDUCATION/TRAINING PROGRAM

## 2024-09-24 PROCEDURE — 3008F BODY MASS INDEX DOCD: CPT | Performed by: STUDENT IN AN ORGANIZED HEALTH CARE EDUCATION/TRAINING PROGRAM

## 2024-09-24 PROCEDURE — 3078F DIAST BP <80 MM HG: CPT | Performed by: STUDENT IN AN ORGANIZED HEALTH CARE EDUCATION/TRAINING PROGRAM

## 2024-09-24 PROCEDURE — 83036 HEMOGLOBIN GLYCOSYLATED A1C: CPT | Performed by: STUDENT IN AN ORGANIZED HEALTH CARE EDUCATION/TRAINING PROGRAM

## 2024-09-24 RX ORDER — CARVEDILOL 12.5 MG/1
12.5 TABLET ORAL
Qty: 180 TABLET | Refills: 1 | Status: SHIPPED | OUTPATIENT
Start: 2024-09-24

## 2024-09-24 RX ORDER — CHLORTHALIDONE 50 MG/1
50 TABLET ORAL DAILY
Qty: 90 TABLET | Refills: 1 | Status: SHIPPED | OUTPATIENT
Start: 2024-09-24

## 2024-09-24 RX ORDER — LISINOPRIL 40 MG/1
40 TABLET ORAL DAILY
Qty: 90 TABLET | Refills: 1 | Status: SHIPPED | OUTPATIENT
Start: 2024-09-24

## 2024-09-24 RX ORDER — BLOOD-GLUCOSE SENSOR
EACH MISCELLANEOUS
Qty: 2 EACH | Refills: 3 | Status: SHIPPED | OUTPATIENT
Start: 2024-09-24

## 2024-09-24 RX ORDER — ATORVASTATIN CALCIUM 80 MG/1
80 TABLET, FILM COATED ORAL DAILY
Qty: 100 TABLET | Refills: 3 | Status: SHIPPED | OUTPATIENT
Start: 2024-09-24 | End: 2025-10-29

## 2024-09-24 RX ORDER — SEMAGLUTIDE 0.68 MG/ML
0.5 INJECTION, SOLUTION SUBCUTANEOUS WEEKLY
Qty: 3 ML | Refills: 2 | Status: SHIPPED | OUTPATIENT
Start: 2024-09-24

## 2024-09-24 RX ORDER — DULOXETIN HYDROCHLORIDE 60 MG/1
60 CAPSULE, DELAYED RELEASE ORAL
Qty: 90 CAPSULE | Refills: 1 | Status: SHIPPED | OUTPATIENT
Start: 2024-09-24 | End: 2025-03-23

## 2024-09-24 RX ORDER — GABAPENTIN 600 MG/1
600 TABLET ORAL 3 TIMES DAILY
Qty: 270 TABLET | Refills: 1 | Status: SHIPPED | OUTPATIENT
Start: 2024-09-24

## 2024-09-24 RX ORDER — DULOXETIN HYDROCHLORIDE 30 MG/1
30 CAPSULE, DELAYED RELEASE ORAL EVERY EVENING
Qty: 30 CAPSULE | Refills: 5 | Status: SHIPPED | OUTPATIENT
Start: 2024-09-24 | End: 2025-03-23

## 2024-09-24 NOTE — PROGRESS NOTES
Subjective   Patient ID: John Rojas is a 63 y.o. male who presents for the follow up    Assessment/Plan   Preventative Medicine  -Left before he could get flu shot   -Will get shingles shot at pharmacy   -Colonoscopy: last done 10 years ago. Repeat recommended to be done in 2022, but pt didn't get it done. Will call Dr Peoples to schedule.   -PSA reviewed 0.49  -PHQ2: 0  -Alcohol screening done     #Type II Diabetes   -A1c: 7.2 > 6.7%  -Discussed various options with patient. Diabetic education given  PLAN  -Continue Ozempic at 0.5mg subcutaneous once weekly. No fhx of MEN. Side effect profile discussed  -Follow up in 3 months  -Free style juli rx given   -low carb diet advised  -No diabetic foot wounds noted    #HLD  -Atorvastatin 80mg   -Low fat diet     #HTN, essential  -BP stable noting in clinic today 116/76  -Continue low salt diet   -Continue chlorthalidone and lisinopril     #Neuropathy  #Lumbar Radiculopathy  -Stable   -Continue gabapentin      #YUDY/MDD  -States that he still sometimes feel like he has low mood.   -No SI/HI  PLAN  -Continue cymbalta 60mg PO qAM and 30mg PO qPM     #CKD stage IIIa  -sCr 1.5  -eGRR of 50  -Avoid nephrotoxic agents most concerning given patients complaint of chronic back pain educated on the avoidance of NSAIDs  -Check labs today      #NAFLD  -Confirmed by liver bx in 2021   -Low fat diet recommended   -Exercise as tolerated       #URIAH  -Feels as though current mask is not fit correctly  -Will refer to sleep clinic for mask fitment/evaluation.      HPI      63-year-old male presenting in follow-up.   Was started on Ozempic a few months ago. Has been doing well on it. NO major side effects reported. Denies abd pain, n/v/d.   A1c improved to 6.7% today. Agreeable to increase ozempic to 0.5mg subcutaneous once weekly.   No new diabetic foot wounds.   MDD well controlled on cymbalta  Requesting refills on all medications.   Compliant with all home meds.     Denies any chest  "pain, worsening shortness of breath, cough, abdominal pain, nausea, vomiting, diarrhea, fevers or chills and endorses that he is tolerating his medications as prescribed without issue.      PMH: HTN, YUDY/MDD, NAFLD, Neuropathy, chronic lumbar radiculopathy   Surgical Hx: CCY    No family hx of thyroid cancer    Social  T: denies  A: denies  D: denies     Occupation  -Works at motor repair shop  -      Visit Vitals  /76   Pulse 57   Ht 1.905 m (6' 3\")   Wt 125 kg (276 lb)   BMI 34.50 kg/m²   Smoking Status Never   BSA 2.57 m²     PHYSICAL EXAM   Physical Exam     Visit Vitals  /76   Pulse 57   Ht 1.905 m (6' 3\")   Wt 125 kg (276 lb)   BMI 34.50 kg/m²   Smoking Status Never   BSA 2.57 m²      General: NAD. NCAT. Aox3   HEENT: PERRLA. EOMI. MMM. Nares patent bl.  Cardiovascular: RRR. No MRG. S1/S2 wnl.   Respiratory: CTABL. No acute respiratory distress.   GI: Soft, NT abdomen.   MSK: ROM x 4. Chronic lumbar paraspinal tenderness.   Extremities: BLLE trace non-pitting edema. Cap refill < 2 sec.   Skin: No rashes or bruises.   Neuro: Aox3. Cranial Nerves grossly intact. Motor/sensory wnl. Chronic BLLE neuropathy.   Psych: Mood wnl.       REVIEW OF SYSTEMS   ROS In HPI     Allergies   Allergen Reactions    Peanut Unknown       Current Outpatient Medications   Medication Sig Dispense Refill    aspirin 81 mg EC tablet Take 1 tablet (81 mg) by mouth once daily.      atorvastatin (Lipitor) 80 mg tablet Take 1 tablet (80 mg) by mouth once daily. 100 tablet 3    betamethasone valerate (Valisone) 0.1 % cream twice a day.      carvedilol (Coreg) 12.5 mg tablet Take 1 tablet (12.5 mg) by mouth 2 times daily (morning and late afternoon). 180 tablet 1    chlorthalidone (Hygroton) 50 mg tablet Take 1 tablet (50 mg) by mouth once daily. 90 tablet 1    DULoxetine (Cymbalta) 30 mg DR capsule Take 1 capsule (30 mg) by mouth once daily in the evening. Do not crush or chew. 30 capsule 5    DULoxetine (Cymbalta) 60 mg DR " capsule Take 1 capsule (60 mg) by mouth once daily in the morning. Take before meals. Do not crush or chew. 90 capsule 1    FreeStyle Jerome 3 Sensor device Use as Directed 2 each 3    gabapentin (Neurontin) 600 mg tablet Take 1 tablet (600 mg) by mouth 3 times a day. 270 tablet 1    lisinopril 40 mg tablet Take 1 tablet (40 mg) by mouth once daily. 90 tablet 1    multivitamin with minerals tablet Take 1 tablet by mouth once daily.      Ozempic 0.25 mg or 0.5 mg (2 mg/3 mL) pen injector INJECT 0.25 MG SUBCUTANEOUSLY WEEKLY. PRIOR AUTH 3 mL 0    tiZANidine (Zanaflex) 4 mg tablet Take 1 tablet (4 mg) by mouth once daily as needed for muscle spasms. 90 tablet 1    cetirizine (ZyrTEC) 10 mg tablet Take 1 tablet (10 mg) by mouth once daily.       No current facility-administered medications for this visit.       Objective     No visits with results within 4 Month(s) from this visit.   Latest known visit with results is:   Lab on 04/16/2024   Component Date Value Ref Range Status    Hemoglobin A1C 04/16/2024 7.2 (H)  see below % Final    Estimated Average Glucose 04/16/2024 160  Not Established mg/dL Final       Radiology: Reviewed imaging in powerchart.  No results found.    Family History   Problem Relation Name Age of Onset    No Known Problems Mother       Social History     Socioeconomic History    Marital status:    Tobacco Use    Smoking status: Never     Passive exposure: Never    Smokeless tobacco: Never   Vaping Use    Vaping status: Never Used   Substance and Sexual Activity    Alcohol use: Not Currently    Drug use: Never     No past medical history on file.  Past Surgical History:   Procedure Laterality Date    OTHER SURGICAL HISTORY  12/06/2021    Gallbladder surgery    OTHER SURGICAL HISTORY  12/06/2021    Colonoscopy       Charting was completed using voice recognition technology and may include unintended errors.

## 2024-09-25 ENCOUNTER — TELEPHONE (OUTPATIENT)
Dept: PRIMARY CARE | Facility: CLINIC | Age: 64
End: 2024-09-25
Payer: COMMERCIAL

## 2024-09-25 NOTE — TELEPHONE ENCOUNTER
----- Message from Lori Haney sent at 9/25/2024 11:48 AM EDT -----  Renal function stable. Consistent with CKD. Avoid nephrotoxic medications.

## 2024-10-16 DIAGNOSIS — F41.9 ANXIETY: ICD-10-CM

## 2024-10-16 RX ORDER — DULOXETIN HYDROCHLORIDE 60 MG/1
60 CAPSULE, DELAYED RELEASE ORAL DAILY
Qty: 90 CAPSULE | Refills: 1 | Status: SHIPPED | OUTPATIENT
Start: 2024-10-16

## 2024-10-16 NOTE — PROGRESS NOTES
Patient: John Rojas  : 1960 AGE: 64 y.o. SEX:male   MRN: [id number]   Provider: LAUREN Mark     Location [facility]   Service Date: 10/21/2024     PCP: Lori Haney MD   Referred by: Lori Haney MD          [facility]  New Visit Note      HISTORY OF PRESENT ILLNESS     John Rojas is a 64 y.o. male with a h/o Hypertension, Obesity, Depression, Anxiety, Diabetes, CKD, NAFLD, neuropathy, HLD who presents to [facility].    10/21/24: NPV with concerns of URIAH management. On CPAP, still wakes tired. Reports he was dx with URIAH about 20years ago and has been on CPAP since. His last sleep study was performed at [facility] >10 years ago (no diagnostic results available for review today). Current machine is 10 years old and needs replacement. Unsure of pressure settings. Uses PAP every night, difficulty sleeping without it. He does not use humidifier. Comfortable with FFM- he has been purchases supplies from a CPAP store. The following are patient's perceived benefits of PAP: decreased snoring / choking / gasping, refreshing sleep, decreased daytime sleepiness and/or fatigue, sleeps faster at bedtime, decreased nocturnal awakening, and better quality of sleep. Has gained 20 lbs since last sleep study. ---> Split PSG ordered       SLEEP STUDY HISTORY (personally reviewed raw data such as interpretation report, data sheet, hypnogram, and titration table if available and applicable)  - No diagnostic available for review in [facility]    SLEEP-WAKE SCHEDULE    Sleep Patterns: He does have a usual bed partner. In terms of the patient's sleep/wake cycle, he generally gets into bed at approximately 9 PM.  his latency to sleep onset after lights out is 15-30min. During the night, the patient generally awakens 3-4 times nightly. These awakenings are usually brief in duration. Final wake time on weekday mornings is around 4:30  AM.    Compared to weekdays, the patient's sleep schedule is  different on the weekends. Bed time 10PM, wake time 9AM.     Breathing during sleep: snoring, witnessed apneas, mouth breathing, and nocturnal reflux symptoms (without PAP)  Behaviors at night: No   Sleep paralysis: No   Hypnogogic or hypnopompic hallucinations: No   Cataplexy: No     Leg symptoms and timing:  - Sensations: Patient does not have unusual sensations in their extremities that cause an urge to move them   - Movement: Patient has not been told that their legs kick or jerk during sleep    Daytime Symptoms:  On awakening patient reports: waking somewhat refreshed, morning headaches, and morning dry mouth   Patient report some daytime symptoms including: DAYTIME SYMPTOMS: reports sleep inertia feeling sleepy when driving    Sleep environment:  Preferred sleep position: back and side  Room is dark: Yes  Room is quiet: Yes  Room is cool: Yes  Bed comfort: good    SLEEP HABITS  Caffeine consumption: Yes, 4-5 cups/day  Alcohol consumption: Yes, rarely (occasional)  Smoking: No  Marijuana: No  Sleep aids: denies     WEIGHT: lost 10lbs in 1 year      ESS: 7  FAVIOLA: 10    REVIEW OF SYSTEMS     All other systems have been reviewed and are negative.    ALLERGIES     Allergies   Allergen Reactions    Peanut Unknown       MEDICATIONS     Current Outpatient Medications   Medication Sig Dispense Refill    aspirin 81 mg EC tablet Take 1 tablet (81 mg) by mouth once daily.      atorvastatin (Lipitor) 80 mg tablet Take 1 tablet (80 mg) by mouth once daily. 100 tablet 3    betamethasone valerate (Valisone) 0.1 % cream twice a day.      carvedilol (Coreg) 12.5 mg tablet Take 1 tablet (12.5 mg) by mouth 2 times daily (morning and late afternoon). 180 tablet 1    cetirizine (ZyrTEC) 10 mg tablet Take 1 tablet (10 mg) by mouth once daily.      chlorthalidone (Hygroton) 50 mg tablet Take 1 tablet (50 mg) by mouth once daily. 90 tablet 1    DULoxetine (Cymbalta) 30 mg   "capsule Take 1 capsule (30 mg) by mouth once daily in the evening. Do not crush or chew. 30 capsule 5    DULoxetine (Cymbalta) 60 mg DR capsule TAKE 1 CAPSULE ONCE DAILY. DO NOT CRUSH OR CHEW. 90 capsule 1    FreeStyle Jerome 3 Sensor device Use as Directed 2 each 3    gabapentin (Neurontin) 600 mg tablet Take 1 tablet (600 mg) by mouth 3 times a day. 270 tablet 1    lisinopril 40 mg tablet Take 1 tablet (40 mg) by mouth once daily. 90 tablet 1    multivitamin with minerals tablet Take 1 tablet by mouth once daily.      semaglutide (Ozempic) 0.25 mg or 0.5 mg (2 mg/3 mL) pen injector Inject 0.5 mg under the skin 1 (one) time per week. 3 mL 2    tiZANidine (Zanaflex) 4 mg tablet Take 1 tablet (4 mg) by mouth once daily as needed for muscle spasms. 90 tablet 1     No current facility-administered medications for this visit.       PAST HISTORIES     PERTINENT PAST MEDICAL HISTORY: See HPI    PERTINENT PAST SURGICAL HISTORY for Sleep Medicine:  non-contributory    PERTINENT FAMILY HISTORY for Sleep Medicine:  loud snoring- mother and father     PERTINENT SOCIAL HISTORY:  He  reports that he has never smoked. He has never been exposed to tobacco smoke. He has never used smokeless tobacco. He reports that he does not currently use alcohol. He reports that he does not use drugs. He currently lives with spouse.    Active Problems, Allergy List, Medication List, and PMH/PSH/FH/Social Hx have been reviewed and reconciled in chart. No significant changes unless documented in the pertinent chart section. Updates made when necessary.     PHYSICAL EXAM     VITAL SIGNS: /82   Pulse 57   Temp 36.3 °C (97.3 °F)   Resp 16   Ht 1.905 m (6' 3\")   Wt 128 kg (283 lb)   SpO2 94%   BMI 35.37 kg/m²     CURRENT WEIGHT:   Vitals:    10/21/24 1458   Weight: 128 kg (283 lb)      PREVIOUS WEIGHTS:  Wt Readings from Last 3 Encounters:   10/21/24 128 kg (283 lb)   09/24/24 125 kg (276 lb)   04/16/24 129 kg (284 lb)     Physical " "Exam  Constitutional: Awake, not in distress  Lungs: Clear to auscultation bilateral, no cough noted  Heart: Regular rate and rhythm  Skin: Warm, no rash  Neuro: No tremors, moves all extremities  Psych: alert and oriented to time, place, and person    HEENT:   Tonsils enlargement grade 1+   Airway comments: narrow lateral walls   Tongue scalloping: slight   Modified Mallampati score - 3    RESULTS/DATA     No results found for: \"IRON\", \"TRANSFERRIN\", \"IRONSAT\", \"TIBC\", \"FERRITIN\"    Bicarbonate   Date Value Ref Range Status   09/24/2024 28 21 - 32 mmol/L Final       ASSESSMENT/PLAN     Mr. Rojas is a 64 y.o. male and He was referred to the [facility] for evaluation of possible sleep disordered breathing    Problem List, Orders, Assessment, Recommendations:    # URIAH  - Remote hx, no diagnostic available for review; states he had mixed sleep apnea   - On CPAP for past 20 years, current machine very old, need replacement  - Obtain Split PSG, will call with results and order PAP via [facility] (pt request)   -Sleep apnea, PAP therapy education as well as the tips to be successful with PAP treatment was provided at length in clinic today. Patient verbalized understanding.  - Discussed 30-day mask guarantee and insurance requirement regarding PAP compliance and follow-up.   -Diet, exercise, and weight loss were emphasized today in clinic, as were non-supine sleep, avoiding alcohol in the late evening, and driving or operating heavy machinery when sleepy.   - patient will follow-up in 2-3 months and bring equipment to the follow-up clinic      #HTN  BP Readings from Last 1 Encounters:   10/21/24 153/82     - doing well, asymptomatic, denies any headache, blurry vision, chest pain, palpitation, dizziness, lightheadedness, or syncopal episodes  - discussed at length the impact of untreated URIAH and BP control  - supportive management: low salt DASH diet (less than 2000 mg sodium intake " daily), moderate intensity aerobic exercise at least 30 minutes 5 days per week, reduce stress, quit smoking, limit alcohol, lose weight, and monitor BP once daily  - continue current management and follow-up with PCP     #Obesity  BMI Readings from Last 1 Encounters:   10/21/24 35.37 kg/m²     - Encouraged healthy weight loss via diet and exercise  - Weight loss can help in the long term treatment of URIAH.  - Defer management to PCP     All of patient's questions were answered. He verbalizes understanding and agreement with my assessment and plan.    Disposition    Return to clinic in 3 months    I personally spent 45 minutes today (exclusive of procedures) providing care for this patient, including preparation, face to face time, EMR documentation and other services such as review of medical records, diagnostic results, patient education, counseling, and coordination of care.

## 2024-10-20 PROBLEM — G47.30 SLEEP DISORDER BREATHING: Status: ACTIVE | Noted: 2024-10-20

## 2024-10-21 ENCOUNTER — OFFICE VISIT (OUTPATIENT)
Facility: CLINIC | Age: 64
End: 2024-10-21
Payer: COMMERCIAL

## 2024-10-21 VITALS
TEMPERATURE: 97.3 F | SYSTOLIC BLOOD PRESSURE: 153 MMHG | WEIGHT: 283 LBS | HEIGHT: 75 IN | RESPIRATION RATE: 16 BRPM | DIASTOLIC BLOOD PRESSURE: 82 MMHG | OXYGEN SATURATION: 94 % | BODY MASS INDEX: 35.19 KG/M2 | HEART RATE: 57 BPM

## 2024-10-21 DIAGNOSIS — I10 PRIMARY HYPERTENSION: ICD-10-CM

## 2024-10-21 DIAGNOSIS — E66.9 OBESITY (BMI 30-39.9): ICD-10-CM

## 2024-10-21 DIAGNOSIS — G47.33 OBSTRUCTIVE SLEEP APNEA: Primary | ICD-10-CM

## 2024-10-21 PROCEDURE — 3077F SYST BP >= 140 MM HG: CPT | Performed by: NURSE PRACTITIONER

## 2024-10-21 PROCEDURE — 99204 OFFICE O/P NEW MOD 45 MIN: CPT | Performed by: NURSE PRACTITIONER

## 2024-10-21 PROCEDURE — 1036F TOBACCO NON-USER: CPT | Performed by: NURSE PRACTITIONER

## 2024-10-21 PROCEDURE — 3008F BODY MASS INDEX DOCD: CPT | Performed by: NURSE PRACTITIONER

## 2024-10-21 PROCEDURE — 3079F DIAST BP 80-89 MM HG: CPT | Performed by: NURSE PRACTITIONER

## 2024-10-21 PROCEDURE — 99214 OFFICE O/P EST MOD 30 MIN: CPT | Performed by: NURSE PRACTITIONER

## 2024-10-21 ASSESSMENT — SLEEP AND FATIGUE QUESTIONNAIRES
HOW LIKELY ARE YOU TO NOD OFF OR FALL ASLEEP WHILE LYING DOWN TO REST IN THE AFTERNOON WHEN CIRCUMSTANCES PERMIT: HIGH CHANCE OF DOZING
WORRIED_DISTRESSED_DUE_TO_SLEEP: SOMEWHAT
WAKING_TOO_EARLY: MILD
HOW LIKELY ARE YOU TO NOD OFF OR FALL ASLEEP WHILE SITTING QUIETLY AFTER LUNCH WITHOUT ALCOHOL: WOULD NEVER DOZE
SATISFACTION_WITH_CURRENT_SLEEP_PATTERN: SATISFIED
HOW LIKELY ARE YOU TO NOD OFF OR FALL ASLEEP WHILE SITTING AND TALKING TO SOMEONE: WOULD NEVER DOZE
HOW LIKELY ARE YOU TO NOD OFF OR FALL ASLEEP WHILE WATCHING TV: MODERATE CHANCE OF DOZING
SLEEP_PROBLEM_NOTICEABLE_TO_OTHERS: SOMEWHAT
HOW LIKELY ARE YOU TO NOD OFF OR FALL ASLEEP WHILE SITTING AND READING: SLIGHT CHANCE OF DOZING
ESS-CHAD TOTAL SCORE: 7
DIFFICULTY_STAYING_ASLEEP: MODERATE
HOW LIKELY ARE YOU TO NOD OFF OR FALL ASLEEP WHEN YOU ARE A PASSENGER IN A CAR FOR AN HOUR WITHOUT A BREAK: WOULD NEVER DOZE
SLEEP_PROBLEM_INTERFERES_DAILY_ACTIVITIES: SOMEWHAT
SITING INACTIVE IN A PUBLIC PLACE LIKE A CLASS ROOM OR A MOVIE THEATER: SLIGHT CHANCE OF DOZING
HOW LIKELY ARE YOU TO NOD OFF OR FALL ASLEEP IN A CAR, WHILE STOPPED FOR A FEW MINUTES IN TRAFFIC: WOULD NEVER DOZE

## 2024-10-21 NOTE — PATIENT INSTRUCTIONS
[facility]   [address]  [facility]  [address] [address]       NAME: John Rojas   DATE: 10/21/24    Your Sleep Provider Today: LAUREN Mark  Your Primary Care Physician: Lori Haney MD       DIAGNOSIS:   1. Sleep disorder breathing        2. Obstructive sleep apnea  Referral to Adult Sleep Medicine      3. Primary hypertension        4. Obesity (BMI 30-39.9)            Thank you for coming to the Sleep Medicine Clinic today! Your sleep medicine provider today was: LAUREN Makr Below is a summary of your treatment plan, other important information, and our contact numbers:      TREATMENT PLAN     - Get your sleep study scheduled  - Follow-up in 3 months.  - If not already done, sign up for 'My Chart' and send prescription requests or messages through this    Scheduling a Sleep Study    Your provider ordered a sleep apnea test today. It will usually take 2 - 3 business days for Insurance to approve the order.     Once you test is approved it will be sent to the ordering sleep lab. When the sleep lab is notified of the new order, they will reach out to you to get you scheduled for an in-lab sleep study or to get you scheduled for a pick-up date for your Home Sleep Study Kit (depending on which type of study your provider recommended).    They usually will reach out to you about 1 week after your test has been ordered. Please contact the office at [phone] if you have not heard back from them in 2 weeks after you have seen your provider. If your sleep study was ordered through  [employer] (mail away kit) you can call them at [phone] if you do not hear from them within 2 weeks.     Sleep Testing for sleep apnea: The best and ideal way to check out if you have sleep apnea is to do an overnight sleep study in the sleep laboratory. Alternatively, a home sleep apnea test can also be done depending on your  insurance and risk factors.     If you are having a home sleep apnea test, kindly allot 1 hour during pickup of the testing kit as you will have to complete paperwork and listen to the sleep technician for in-person on-the-spot demonstration and instructions on how to hook up the testing kit at home. Do the test for 1 day and start off with sleeping on your back. If you sleep on your side in the middle of night or you have always been a side or stomach-sleeper, it is ok as long as you have some time on your back and off-back.     If you are having an overnight in-lab sleep study, please make sure to bring toiletries, a comfy pillow, additional warm blankets, and any nighttime medications (include as-needed inhaler, pain pill, etc) that you may regularly take. Also, be sure to eat dinner before you arrive as we generally do not provide meals inside the sleep testing center. Lastly, in order to fall asleep faster in the sleep testing center, we advise patients to wake up 2 hours earlier on the morning of scheduled testing and avoid napping 2 days prior testing. Sometimes, your sleep provider may prescribe a sleep aid to be taken at lights out in the sleep testing center. If you are taking a sleep aid, consider having somebody pick you up after the sleep testing.    Overnight sleep studies may be scheduled on a weekday or weekend. We also perform daytime testing for shift workers on a case-by-case basis.    Once you have booked an appointment to do the sleep study, please contact my office for follow-up visit to discuss results.       IMPORTANT INFORMATION     Call 911 for medical emergencies.  Our offices are generally open from Monday-Friday, 9 am - 5 pm.  If you need to get in touch with me, you may either call me/my team (number is below) or you can use [employer].  If a referral for a test, for CPAP, or for another specialist was made, and you have not heard about scheduling this within a week, please call scheduling  "at [phone] ([phone]).  If you are unable to make your appointment for clinic or an overnight study, kindly call the office at least 48 hours in advance to cancel and reschedule.  If you are on CPAP, please bring your device's card or the device to each clinic appointment.   There are no supporting services by either the sleep doctors or their staff on weekends and Holidays, or after 5 PM on weekdays.     PRESCRIPTIONS     We require 7 days advanced notice for prescription refills. If we do not receive the request in this time, we cannot guarantee that your medication will be refilled in time.      IMPORTANT PHONE NUMBERS     [facility]: [phone]  [employer] (DME): [phone]  [employer] (DME): [phone]  [facility] (DME): [phone]    CONTACTING YOUR SLEEP MEDICINE PROVIDER AND SLEEP TEAM      For issues with your machine or mask interface, please call your DME provider first. [employer] stands for durable medical company. [employer] is the company who provides you the machine and/or PAP supplies / accessories.   To schedule, cancel, or reschedule SLEEP STUDY APPOINTMENTS, please call the Main Phone Line at [phone] - option 3.   To schedule, cancel, or reschedule CLINIC APPOINTMENTS, you can do it in \"[employer] inchart\", call [phone] for [facility] office , [phone] for [facility] office to speak with my on site staff, or call the Main Phone Line at [phone] - option 2  For CLINICAL QUESTIONS or MEDICATION REFILLS, please call direct line for Adult Sleep Nurses at [phone].   Lastly, you can also send a message directly to your provider through \"My Chart\", which is the email service through your  Records Account: [email or web address]     Adult Sleep Nurses (Angelina Yu RN and Beryl Sullivan RN):  For clinical questions and refilling prescriptions: [phone]  Email sleep diaries and other documents at: " [email or web address]    Office locations for Melissa Ernst NP:    [facility] [place]   [address]   [address]  [place] [address] [phone]    [address]  [address]  [place], [place] [address] [phone]    [address]  [address]  [place] [address]   [phone]    OUR SLEEP TESTING LOCATIONS     Our team will contact you to schedule your sleep study, however, you can contact us as follow:  Main Phone Line (scheduling only): [phone]), option 3    Sleep Testing Locations:   [place] (18 years and older): [address] [place] [address], 2nd floor   [place] (18 years and older): [address]; 4th floor  After hours line: [phone]   [place] (18 years and older) at [place]: [address] [place] [address]  After hours line: [phone]   [facility] (Main campus: All ages): [facility], 6th floor. After hours line: [phone]   [place] (5 years and older; younger considered on case-by-case basis): [address]; [address], [address]. Scheduling  After hours line: [phone]       Here at [facility], we wish you a restful sleep!    Your sleep medicine provider for this visit was: Melissa Ernst, APRN-CNP

## 2024-11-12 ENCOUNTER — CLINICAL SUPPORT (OUTPATIENT)
Dept: SLEEP MEDICINE | Facility: HOSPITAL | Age: 64
End: 2024-11-12
Payer: COMMERCIAL

## 2024-11-12 VITALS — BODY MASS INDEX: 35.09 KG/M2 | WEIGHT: 282.19 LBS | HEIGHT: 75 IN

## 2024-11-12 DIAGNOSIS — G47.33 OBSTRUCTIVE SLEEP APNEA: ICD-10-CM

## 2024-11-12 ASSESSMENT — SLEEP AND FATIGUE QUESTIONNAIRES
ESS-CHAD TOTAL SCORE: 8
HOW LIKELY ARE YOU TO NOD OFF OR FALL ASLEEP WHILE LYING DOWN TO REST IN THE AFTERNOON WHEN CIRCUMSTANCES PERMIT: HIGH CHANCE OF DOZING
HOW LIKELY ARE YOU TO NOD OFF OR FALL ASLEEP WHILE SITTING AND READING: MODERATE CHANCE OF DOZING
HOW LIKELY ARE YOU TO NOD OFF OR FALL ASLEEP WHILE SITTING AND TALKING TO SOMEONE: WOULD NEVER DOZE
HOW LIKELY ARE YOU TO NOD OFF OR FALL ASLEEP WHILE WATCHING TV: MODERATE CHANCE OF DOZING
SITING INACTIVE IN A PUBLIC PLACE LIKE A CLASS ROOM OR A MOVIE THEATER: SLIGHT CHANCE OF DOZING
HOW LIKELY ARE YOU TO NOD OFF OR FALL ASLEEP WHEN YOU ARE A PASSENGER IN A CAR FOR AN HOUR WITHOUT A BREAK: WOULD NEVER DOZE
HOW LIKELY ARE YOU TO NOD OFF OR FALL ASLEEP IN A CAR, WHILE STOPPED FOR A FEW MINUTES IN TRAFFIC: WOULD NEVER DOZE
HOW LIKELY ARE YOU TO NOD OFF OR FALL ASLEEP WHILE SITTING QUIETLY AFTER LUNCH WITHOUT ALCOHOL: WOULD NEVER DOZE

## 2024-11-13 NOTE — PROGRESS NOTES
[facility] TECH NOTE:     Patient: John Rojas   MRN//AGE: [id number]  1960  64 y.o.   Technologist: Hafsa Villa   Room: 403   Service Date: 2024        Sleep Testing Location[place] [facility] [place]    [place]: 8    TECHNOLOGIST SLEEP STUDY PROCEDURE NOTE:   This sleep study is being conducted according to the policies and procedures outlined by the AAS accreditation standards.  The sleep study procedure and processes involved during this appointment was explained to the patient/patient’s family, questions were answered. The patient/family verbalized understanding.      The patient is a 64 y.o. year old male scheduled for a Diagnostic PSG Split night with montage of:  Split . he arrived for his appointment.      The study that was ultimately completed was a Diagnostic PSG Split night with montage of:  Split .    The full study Was completed.  Patient questionnaires completed?: yes     Consents signed? yes    Initial Fall Risk Screening:     John has fallen in the last 6 months. his fall did not result in injury. John does not have a fear of falling. He does not need assistance with sitting, standing, or walking. he does not need assistance walking in his home. he does not need assistance in an unfamiliar setting. The patient is not using an assistive device.     Brief Study observations: The patient arrived for his ordered Split study of AHI >10. The patient is on PAP at home, for the past 20 years, and has worsening symptoms and needs new equipment. The patient requested to use his CPAP mask from home, despite it being recalled due to containing magnets. The patient confirmed he does not have any implant devices. The hook-up was done and the purpose of all sensors was explained. A normal sleep latency was seen. Once asleep, intermittent moderate snoring was heard. Severe respiratory events were seen, so CPAP was applied after >2 hours of sleep (ResMed Airfit F20 Medium FFM). All sleep stages were not seen. REM  supine was not captured. The patient was observed sleeping in supine, and right side positions. Possible PLMS noted. NSR was observed throughout the study.       Deviation to order/protocol and reason: N/A      If PAP, which was preferred mask/pressure/mode: ResMed Airfit F20 Medium FFM, 12 cm H2O      Other: None    After the procedure, the patient/family was informed to ensure followup with ordering clinician for testing results.      Technologist: Hafsa Villa

## 2024-11-22 ENCOUNTER — TELEPHONE (OUTPATIENT)
Dept: SLEEP MEDICINE | Facility: HOSPITAL | Age: 64
End: 2024-11-22
Payer: COMMERCIAL

## 2024-11-22 DIAGNOSIS — G47.33 OSA (OBSTRUCTIVE SLEEP APNEA): Primary | ICD-10-CM

## 2024-11-22 NOTE — TELEPHONE ENCOUNTER
Called patient and left VM regarding the availability of sleep test result.  Sleep nurse phone number [phone] - given to call back for results and plan going forward.

## 2024-11-22 NOTE — TELEPHONE ENCOUNTER
----- Message from Melissa Ernst sent at 11/22/2024 12:42 PM EST -----  Regarding: call w results  Can you call this patient about their Split PSG and tell them they have Severe URIAH for which I have placed an order APAP at 5-15 cwp via [employer] (pt. Request). Please fax all documents to [employer] for this new set up.    Thanks,  Melissa

## 2024-11-26 NOTE — TELEPHONE ENCOUNTER
Second attempt    Called patient and left VM regarding the availability of sleep test result.  Sleep nurse phone number [phone] - given to call back for results and plan going forward.

## 2024-12-19 ENCOUNTER — APPOINTMENT (OUTPATIENT)
Dept: PRIMARY CARE | Facility: CLINIC | Age: 64
End: 2024-12-19
Payer: COMMERCIAL

## 2024-12-19 VITALS
HEART RATE: 71 BPM | SYSTOLIC BLOOD PRESSURE: 114 MMHG | HEIGHT: 75 IN | BODY MASS INDEX: 34.44 KG/M2 | WEIGHT: 277 LBS | OXYGEN SATURATION: 97 % | DIASTOLIC BLOOD PRESSURE: 72 MMHG

## 2024-12-19 DIAGNOSIS — I95.1 ORTHOSTASIS: ICD-10-CM

## 2024-12-19 DIAGNOSIS — I10 PRIMARY HYPERTENSION: ICD-10-CM

## 2024-12-19 DIAGNOSIS — K21.9 GASTROESOPHAGEAL REFLUX DISEASE, UNSPECIFIED WHETHER ESOPHAGITIS PRESENT: Primary | ICD-10-CM

## 2024-12-19 DIAGNOSIS — E11.9 NEW ONSET TYPE 2 DIABETES MELLITUS (MULTI): ICD-10-CM

## 2024-12-19 DIAGNOSIS — K21.9 GASTROESOPHAGEAL REFLUX DISEASE, UNSPECIFIED WHETHER ESOPHAGITIS PRESENT: ICD-10-CM

## 2024-12-19 DIAGNOSIS — N18.9 CHRONIC KIDNEY DISEASE, UNSPECIFIED CKD STAGE: ICD-10-CM

## 2024-12-19 DIAGNOSIS — Z23 ENCOUNTER FOR IMMUNIZATION: ICD-10-CM

## 2024-12-19 LAB — POC HEMOGLOBIN A1C: 7.1 % (ref 4.2–6.5)

## 2024-12-19 PROCEDURE — 1036F TOBACCO NON-USER: CPT | Performed by: STUDENT IN AN ORGANIZED HEALTH CARE EDUCATION/TRAINING PROGRAM

## 2024-12-19 PROCEDURE — 99214 OFFICE O/P EST MOD 30 MIN: CPT | Performed by: STUDENT IN AN ORGANIZED HEALTH CARE EDUCATION/TRAINING PROGRAM

## 2024-12-19 PROCEDURE — 3051F HG A1C>EQUAL 7.0%<8.0%: CPT | Performed by: STUDENT IN AN ORGANIZED HEALTH CARE EDUCATION/TRAINING PROGRAM

## 2024-12-19 PROCEDURE — 3074F SYST BP LT 130 MM HG: CPT | Performed by: STUDENT IN AN ORGANIZED HEALTH CARE EDUCATION/TRAINING PROGRAM

## 2024-12-19 PROCEDURE — 83036 HEMOGLOBIN GLYCOSYLATED A1C: CPT | Performed by: STUDENT IN AN ORGANIZED HEALTH CARE EDUCATION/TRAINING PROGRAM

## 2024-12-19 PROCEDURE — 3078F DIAST BP <80 MM HG: CPT | Performed by: STUDENT IN AN ORGANIZED HEALTH CARE EDUCATION/TRAINING PROGRAM

## 2024-12-19 PROCEDURE — 90471 IMMUNIZATION ADMIN: CPT | Performed by: STUDENT IN AN ORGANIZED HEALTH CARE EDUCATION/TRAINING PROGRAM

## 2024-12-19 PROCEDURE — 3008F BODY MASS INDEX DOCD: CPT | Performed by: STUDENT IN AN ORGANIZED HEALTH CARE EDUCATION/TRAINING PROGRAM

## 2024-12-19 PROCEDURE — 4010F ACE/ARB THERAPY RXD/TAKEN: CPT | Performed by: STUDENT IN AN ORGANIZED HEALTH CARE EDUCATION/TRAINING PROGRAM

## 2024-12-19 PROCEDURE — 90673 RIV3 VACCINE NO PRESERV IM: CPT | Performed by: STUDENT IN AN ORGANIZED HEALTH CARE EDUCATION/TRAINING PROGRAM

## 2024-12-19 RX ORDER — FAMOTIDINE 20 MG/1
20 TABLET, FILM COATED ORAL 2 TIMES DAILY
Qty: 120 TABLET | Refills: 0 | Status: SHIPPED | OUTPATIENT
Start: 2024-12-19 | End: 2024-12-20

## 2024-12-19 RX ORDER — LISINOPRIL 30 MG/1
30 TABLET ORAL DAILY
Qty: 90 TABLET | Refills: 0 | Status: SHIPPED | OUTPATIENT
Start: 2024-12-19 | End: 2025-03-19

## 2024-12-19 RX ORDER — SEMAGLUTIDE 0.68 MG/ML
0.5 INJECTION, SOLUTION SUBCUTANEOUS WEEKLY
Qty: 3 ML | Refills: 2 | Status: SHIPPED | OUTPATIENT
Start: 2024-12-19

## 2024-12-19 NOTE — PROGRESS NOTES
Subjective   Patient ID: John Rojas is a 64 y.o. male who presents for the follow up    Assessment/Plan   Preventative Medicine (April 2024)  -Flu shot today   -Will get shingles shot at pharmacy   -Colonoscopy: done in July 2024. Repeat in 2029  -PSA reviewed 0.49 (April 2024)  -PHQ2: 0  -Alcohol screening done     #Type II Diabetes   -A1c: 7.2 > 6.7% > 7.1%  -Discussed various options with patient. Diabetic education given  PLAN  -Continue Ozempic at 0.5mg subcutaneous once weekly. No fhx of MEN. Side effect profile discussed.  -Follow up in 3 months  -Free style juli rx given   -low carb diet advised  -No diabetic foot wounds noted    #HLD  -Atorvastatin 80mg   -Low fat diet     #HTN, essential  -BP stable noting in clinic today 114/72  -Continue low salt diet   -Continue chlorthalidone and lisinopril     #Neuropathy  #Lumbar Radiculopathy  -Stable   -Continue gabapentin      #YUDY/MDD  -Improving  PLAN  -Continue cymbalta 60mg PO qAM and 30mg PO qPM     #CKD stage IIIa  -Check labs today   -Avoid nephrotoxic agents most concerning given patients complaint of chronic back pain educated on the avoidance of NSAIDs  -Continue lisinopril 30mg PO dailyl  -Consider starting farxiga based on labs      #NAFLD  -Confirmed by liver bx in 2021   -Low fat diet recommended   -Exercise as tolerated       #URIAH  -Feels as though current mask is not fit correctly  -Is following with sleep clinic     #Orthostasis  -happened twice  -Wants to decrease BP medications  -Will decrease lisinpril to 30mg Po daily    #Acid Reflux   -Has acid taste in his mouth that occurs at night   -Was previously on nexium but was taken off   -Trial of famotidine  -Refer to GI if symptoms dont resume  -Discontinue zanaflex       HPI     64-year-old male presenting in follow-up.   Doing well on ozempic.   No major side effects  A1c mildly increased to 7.1. Advise working on diet.   No new diabetic foot wounds.   BP well controlled  Endorses all  "medication compliance.     C/O increased acid reflux. Previously on nexium but stopped due to DO. Eats a relatively unhealthy diet and eats out often. Dietary modification advised.     Orthostatic episodes that happened twice without LOC. Once while climbing stairs and once when getting out of bed. Will decrease lisinopril to 30mg PO daily. No CP, SOB/GONZALEZ, palpitations, vision changes reported.     Denies any chest pain, worsening shortness of breath, cough, abdominal pain, nausea, vomiting, diarrhea, fevers or chills and endorses that he is tolerating his medications as prescribed without issue.    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 NCAT. Aox3   HEENT: PERRLA. EOMI. MMM. Nares patent bl.  Cardiovascular: RRR. No MRG. S1/S2 wnl.   Respiratory: CTABL. No acute respiratory distress.   GI: Soft, NT abdomen.   MSK: ROM x 4. Chronic lumbar paraspinal tenderness.   Extremities: BLLE trace non-pitting edema. Cap refill < 2 sec.   Skin: No rashes or bruises.   Neuro: Aox3. Cranial Nerves grossly intact. Motor/sensory wnl. Chronic BLLE neuropathy.   Psych: Mood wnl.       REVIEW OF SYSTEMS   ROS In HPI     Allergies   Allergen Reactions    Peanut Unknown       Current Outpatient Medications   Medication Sig Dispense Refill    aspirin 81 mg EC tablet Take 1 tablet (81 mg) by mouth once daily.      atorvastatin (Lipitor) 80 mg tablet Take 1 tablet (80 mg) by mouth once daily. 100 tablet 3    betamethasone " valerate (Valisone) 0.1 % cream twice a day.      carvedilol (Coreg) 12.5 mg tablet Take 1 tablet (12.5 mg) by mouth 2 times daily (morning and late afternoon). 180 tablet 1    cetirizine (ZyrTEC) 10 mg tablet Take 1 tablet (10 mg) by mouth once daily.      chlorthalidone (Hygroton) 50 mg tablet Take 1 tablet (50 mg) by mouth once daily. 90 tablet 1    DULoxetine (Cymbalta) 30 mg DR capsule Take 1 capsule (30 mg) by mouth once daily in the evening. Do not crush or chew. 30 capsule 5    DULoxetine (Cymbalta) 60 mg DR capsule TAKE 1 CAPSULE ONCE DAILY. DO NOT CRUSH OR CHEW. 90 capsule 1    FreeStyle Jerome 3 Sensor device Use as Directed 2 each 3    gabapentin (Neurontin) 600 mg tablet Take 1 tablet (600 mg) by mouth 3 times a day. 270 tablet 1    lisinopril 40 mg tablet Take 1 tablet (40 mg) by mouth once daily. 90 tablet 1    multivitamin with minerals tablet Take 1 tablet by mouth once daily.      semaglutide (Ozempic) 0.25 mg or 0.5 mg (2 mg/3 mL) pen injector Inject 0.5 mg under the skin 1 (one) time per week. 3 mL 2    tiZANidine (Zanaflex) 4 mg tablet Take 1 tablet (4 mg) by mouth once daily as needed for muscle spasms. 90 tablet 1     No current facility-administered medications for this visit.       Objective     Lab on 09/24/2024   Component Date Value Ref Range Status    Glucose 09/24/2024 109 (H)  74 - 99 mg/dL Final    Sodium 09/24/2024 140  136 - 145 mmol/L Final    Potassium 09/24/2024 3.6  3.5 - 5.3 mmol/L Final    Chloride 09/24/2024 102  98 - 107 mmol/L Final    Bicarbonate 09/24/2024 28  21 - 32 mmol/L Final    Anion Gap 09/24/2024 14  10 - 20 mmol/L Final    Urea Nitrogen 09/24/2024 23  6 - 23 mg/dL Final    Creatinine 09/24/2024 1.42 (H)  0.50 - 1.30 mg/dL Final    eGFR 09/24/2024 56 (L)  >60 mL/min/1.73m*2 Final    Calcium 09/24/2024 9.6  8.6 - 10.6 mg/dL Final    Phosphorus 09/24/2024 4.0  2.5 - 4.9 mg/dL Final    Albumin 09/24/2024 4.6  3.4 - 5.0 g/dL Final   Office Visit on 09/24/2024    Component Date Value Ref Range Status    POC HEMOGLOBIN A1c 09/24/2024 6.7 (A)  4.2 - 6.5 % Final       Radiology: Reviewed imaging in powerchart.  No results found.    Family History   Problem Relation Name Age of Onset    No Known Problems Mother       Social History     Socioeconomic History    Marital status:    Tobacco Use    Smoking status: Never     Passive exposure: Never    Smokeless tobacco: Never   Vaping Use    Vaping status: Never Used   Substance and Sexual Activity    Alcohol use: Not Currently    Drug use: Never     No past medical history on file.  Past Surgical History:   Procedure Laterality Date    OTHER SURGICAL HISTORY  12/06/2021    Gallbladder surgery    OTHER SURGICAL HISTORY  12/06/2021    Colonoscopy       Charting was completed using voice recognition technology and may include unintended errors.

## 2024-12-20 RX ORDER — FAMOTIDINE 20 MG/1
20 TABLET, FILM COATED ORAL 2 TIMES DAILY
Qty: 180 TABLET | Refills: 3 | Status: SHIPPED | OUTPATIENT
Start: 2024-12-20

## 2024-12-31 LAB
NON-UH HIE A/G RATIO: 1.3
NON-UH HIE ALB: 4.3 G/DL (ref 3.4–5)
NON-UH HIE ALK PHOS: 79 UNIT/L (ref 45–117)
NON-UH HIE BILIRUBIN, TOTAL: 1.9 MG/DL (ref 0.3–1.2)
NON-UH HIE BUN/CREAT RATIO: 13.6
NON-UH HIE BUN: 19 MG/DL (ref 9–23)
NON-UH HIE CALCIUM: 10.2 MG/DL (ref 8.7–10.4)
NON-UH HIE CALCULATED OSMOLALITY: 282 MOSM/KG (ref 275–295)
NON-UH HIE CHLORIDE: 103 MMOL/L (ref 98–107)
NON-UH HIE CO2, VENOUS: 30 MMOL/L (ref 20–31)
NON-UH HIE CREATININE, URINE MG/DL: 301.1 MG/DL
NON-UH HIE CREATININE: 1.4 MG/DL (ref 0.6–1.1)
NON-UH HIE GFR AA: >60
NON-UH HIE GLOBULIN: 3.2 G/DL
NON-UH HIE GLOMERULAR FILTRATION RATE: 51 ML/MIN/1.73M?
NON-UH HIE GLUCOSE: 132 MG/DL (ref 74–106)
NON-UH HIE GOT: 46 UNIT/L (ref 15–37)
NON-UH HIE GPT: 60 UNIT/L (ref 10–49)
NON-UH HIE K: 3.6 MMOL/L (ref 3.5–5.1)
NON-UH HIE MICROALBUMIN, URINE MG/L: 10 MG/L
NON-UH HIE MICROALBUMIN/CREATININE RATIO: 3 MG MALB/GM CREAT (ref 0–30)
NON-UH HIE NA: 139 MMOL/L (ref 135–145)
NON-UH HIE TOTAL PROTEIN: 7.5 G/DL (ref 5.7–8.2)

## 2025-01-02 ENCOUNTER — TELEPHONE (OUTPATIENT)
Dept: PRIMARY CARE | Facility: CLINIC | Age: 65
End: 2025-01-02
Payer: COMMERCIAL

## 2025-01-02 NOTE — TELEPHONE ENCOUNTER
----- Message from Lori Haney sent at 12/31/2024  4:22 PM EST -----  Renal function consistent with CKD and is stable.   Mild elevation in LFT and bilirubin. Has a hx of NAFLD. Should follow up with GI. Refer to Dr Petra Schaeffer.

## 2025-01-02 NOTE — TELEPHONE ENCOUNTER
Spoke to pt informed him of below information. Expressed understanding, requesting information sent in  msg.

## 2025-01-15 NOTE — PROGRESS NOTES
Patient: John Rojas  : 1960 AGE: 64 y.o. SEX:male   MRN: [id number]   Provider: SHINE Mark-CNP     Location [facility]   Service Date: 2025     PCP: Lori Haney MD   Referred by: No ref. provider found          [facility]  Follow Up Visit Note      HISTORY OF PRESENT ILLNESS     John Rojas is a 64 y.o. male with a h/o Hypertension, Obesity, Depression, Anxiety, Diabetes, CKD, NAFLD, neuropathy, HLD who presents to [facility].    25: After sleep study was completed, APAP was ordered through [employer] in November but patient did not hear from DME about his replacement machine. Brought REM Star M Serious machine to office visit today; set on CPAP 10CWP, machine very old. Uses every night with positive benefit. ---> RePAP 7-15 CWP via [facility]       10/21/24: NPV with concerns of URIAH management. On CPAP, still wakes tired. Reports he was dx with URIAH about 20years ago and has been on CPAP since. His last sleep study was performed at [facility] >10 years ago (no diagnostic results available for review today). Current machine is 10 years old and needs replacement. Unsure of pressure settings. Uses PAP every night, difficulty sleeping without it. He does not use humidifier. Comfortable with FFM- he has been purchases supplies from a CPAP store. The following are patient's perceived benefits of PAP: decreased snoring / choking / gasping, refreshing sleep, decreased daytime sleepiness and/or fatigue, sleeps faster at bedtime, decreased nocturnal awakening, and better quality of sleep. Has gained 20 lbs since last sleep study. ---> Split PSG ordered       SLEEP STUDY HISTORY (personally reviewed raw data such as interpretation report, data sheet, hypnogram, and titration table if available and applicable)  --Split PSG 2024-showing severe URIAH with RDI 3% 67.9, RDI 4% 60.7, MARILYNN 0.0, SpO2 martha 77% REM sleep without  atonia was noted this finding was not associated with vocalization or with abnormal motor behavior during the sleep study.  Clinical correlation is recommended.  Frequent periodic limb movements of sleep were noted with a PLM index of 37.9/hour. Consider APAP 5-15 cm H2O with EPR off via a ResMed AirFit F20 fullface mask size medium.    SLEEP-WAKE SCHEDULE    Sleep Patterns: He does have a usual bed partner. In terms of the patient's sleep/wake cycle, he generally gets into bed at approximately 9 PM.  his latency to sleep onset after lights out is 15-30min. During the night, the patient generally awakens 3-4 times nightly. These awakenings are usually brief in duration. Final wake time on weekday mornings is around 4:30 AM.    Compared to weekdays, the patient's sleep schedule is  different on the weekends. Bed time 10PM, wake time 9AM.     Breathing during sleep: snoring, witnessed apneas, mouth breathing, and nocturnal reflux symptoms (without PAP)  Behaviors at night: No   Sleep paralysis: No   Hypnogogic or hypnopompic hallucinations: No   Cataplexy: No     Leg symptoms and timing:  - Sensations: Patient does not have unusual sensations in their extremities that cause an urge to move them   - Movement: Patient has not been told that their legs kick or jerk during sleep    Daytime Symptoms:  On awakening patient reports: waking somewhat refreshed, morning headaches, and morning dry mouth   Patient report some daytime symptoms including: DAYTIME SYMPTOMS: reports sleep inertia feeling sleepy when driving    Sleep environment:  Preferred sleep position: back and side  Room is dark: Yes  Room is quiet: Yes  Room is cool: Yes  Bed comfort: good    SLEEP HABITS  Caffeine consumption: Yes, 4-5 cups/day  Alcohol consumption: Yes, rarely (occasional)  Smoking: No  Marijuana: No  Sleep aids: denies     WEIGHT: lost 10lbs in 1 year      ESS: 7    REVIEW OF SYSTEMS     All other systems have been reviewed and are  negative.    ALLERGIES     Allergies   Allergen Reactions    Peanut Unknown       MEDICATIONS     Current Outpatient Medications   Medication Sig Dispense Refill    aspirin 81 mg EC tablet Take 1 tablet (81 mg) by mouth once daily.      atorvastatin (Lipitor) 80 mg tablet Take 1 tablet (80 mg) by mouth once daily. 100 tablet 3    betamethasone valerate (Valisone) 0.1 % cream twice a day.      carvedilol (Coreg) 12.5 mg tablet Take 1 tablet (12.5 mg) by mouth 2 times daily (morning and late afternoon). 180 tablet 1    cetirizine (ZyrTEC) 10 mg tablet Take 1 tablet (10 mg) by mouth once daily.      chlorthalidone (Hygroton) 50 mg tablet Take 1 tablet (50 mg) by mouth once daily. 90 tablet 1    DULoxetine (Cymbalta) 30 mg DR capsule Take 1 capsule (30 mg) by mouth once daily in the evening. Do not crush or chew. 30 capsule 5    DULoxetine (Cymbalta) 60 mg DR capsule TAKE 1 CAPSULE ONCE DAILY. DO NOT CRUSH OR CHEW. 90 capsule 1    famotidine (Pepcid) 20 mg tablet TAKE 1 TABLET TWICE A  tablet 3    FreeStyle Jerome 3 Sensor device Use as Directed 2 each 3    gabapentin (Neurontin) 600 mg tablet Take 1 tablet (600 mg) by mouth 3 times a day. 270 tablet 1    lisinopril 30 mg tablet Take 1 tablet (30 mg) by mouth once daily. 90 tablet 0    multivitamin with minerals tablet Take 1 tablet by mouth once daily.      semaglutide (Ozempic) 0.25 mg or 0.5 mg (2 mg/3 mL) pen injector Inject 0.5 mg under the skin 1 (one) time per week. 3 mL 2     No current facility-administered medications for this visit.       PAST HISTORIES     PERTINENT PAST MEDICAL HISTORY: See HPI    PERTINENT PAST SURGICAL HISTORY for Sleep Medicine:  non-contributory    PERTINENT FAMILY HISTORY for Sleep Medicine:  loud snoring- mother and father     PERTINENT SOCIAL HISTORY:  He  reports that he has never smoked. He has never been exposed to tobacco smoke. He has never used smokeless tobacco. He reports that he does not currently use alcohol. He  "reports that he does not use drugs. He currently lives with spouse.    Active Problems, Allergy List, Medication List, and PMH/PSH/FH/Social Hx have been reviewed and reconciled in chart. No significant changes unless documented in the pertinent chart section. Updates made when necessary.     PHYSICAL EXAM     VITAL SIGNS: /76   Pulse 60   Temp 36.5 °C (97.7 °F) (Temporal)   Ht 1.905 m (6' 3\")   Wt 123 kg (272 lb)   SpO2 96%   BMI 34.00 kg/m²     CURRENT WEIGHT:   Vitals:    01/22/25 0925   Weight: 123 kg (272 lb)     PREVIOUS WEIGHTS:  Wt Readings from Last 3 Encounters:   01/22/25 123 kg (272 lb)   12/19/24 126 kg (277 lb)   11/12/24 128 kg (282 lb 3 oz)     Constitutional: Alert and oriented, cooperative, no obvious distress   HEENT: Non icteric or anemic, EOM WNL bilaterally   Neck: Supple, no JVD, no goiter, no adenopathy, no rigidity     RESULTS/DATA     No results found for: \"IRON\", \"TRANSFERRIN\", \"IRONSAT\", \"TIBC\", \"FERRITIN\"    Bicarbonate   Date Value Ref Range Status   09/24/2024 28 21 - 32 mmol/L Final       ASSESSMENT/PLAN     Mr. Rojas is a 64 y.o. male and He was referred to the [facility] for evaluation of URIAH    Problem List, Orders, Assessment, Recommendations:    # URIAH, severe  -Split PSG 11/12/2024-showing severe URIAH with RDI 3% 67.9, RDI 4% 60.7, MARILYNN 0.0, SpO2 martha 77% REM sleep without atonia was noted this finding was not associated with vocalization or with abnormal motor behavior during the sleep study.  Clinical correlation is recommended.  Frequent periodic limb movements of sleep were noted with a PLM index of 37.9/hour. Consider APAP 5-15 cm H2O with EPR off via a ResMed AirFit F20 fullface mask size medium.    1/22/25:  - Brought REM Star M Serious machine to office visit today; set on CPAP 10CWP, machine very old and needs replacement  - APAP ordered but has not been set up through [facility] as previously ordered  - Will switch to [facility] " RePAP 7-15 CWP     10/21/24:  - Remote hx, no diagnostic available for review; states he had mixed sleep apnea   - On CPAP for past 20 years, current machine very old, need replacement  - Obtain Split PSG, will call with results and order PAP via [facility] (pt request)   -Sleep apnea, PAP therapy education as well as the tips to be successful with PAP treatment was provided at length in clinic today. Patient verbalized understanding.  - Discussed 30-day mask guarantee and insurance requirement regarding PAP compliance and follow-up.   -Diet, exercise, and weight loss were emphasized today in clinic, as were non-supine sleep, avoiding alcohol in the late evening, and driving or operating heavy machinery when sleepy.   - patient will follow-up in 2-3 months and bring equipment to the follow-up clinic      #HTN  BP Readings from Last 1 Encounters:   01/22/25 108/76     - doing well, asymptomatic, denies any headache, blurry vision, chest pain, palpitation, dizziness, lightheadedness, or syncopal episodes  - discussed at length the impact of untreated UIRAH and BP control  - supportive management: low salt DASH diet (less than 2000 mg sodium intake daily), moderate intensity aerobic exercise at least 30 minutes 5 days per week, reduce stress, quit smoking, limit alcohol, lose weight, and monitor BP once daily  - continue current management and follow-up with PCP     #Obesity  BMI Readings from Last 1 Encounters:   01/22/25 34.00 kg/m²     - Encouraged healthy weight loss via diet and exercise  - Weight loss can help in the long term treatment of URIAH.  - Defer management to PCP     All of patient's questions were answered. He verbalizes understanding and agreement with my assessment and plan.    Disposition    Return to clinic 31-90 days after receiving new PAP machine

## 2025-01-22 ENCOUNTER — APPOINTMENT (OUTPATIENT)
Dept: SLEEP MEDICINE | Facility: CLINIC | Age: 65
End: 2025-01-22
Payer: COMMERCIAL

## 2025-01-22 VITALS
BODY MASS INDEX: 33.82 KG/M2 | HEIGHT: 75 IN | TEMPERATURE: 97.7 F | SYSTOLIC BLOOD PRESSURE: 108 MMHG | DIASTOLIC BLOOD PRESSURE: 76 MMHG | WEIGHT: 272 LBS | HEART RATE: 60 BPM | OXYGEN SATURATION: 96 %

## 2025-01-22 DIAGNOSIS — G47.33 OBSTRUCTIVE SLEEP APNEA: Primary | ICD-10-CM

## 2025-01-22 DIAGNOSIS — E66.9 OBESITY (BMI 30-39.9): ICD-10-CM

## 2025-01-22 DIAGNOSIS — I10 PRIMARY HYPERTENSION: ICD-10-CM

## 2025-01-22 PROCEDURE — 3008F BODY MASS INDEX DOCD: CPT | Performed by: NURSE PRACTITIONER

## 2025-01-22 PROCEDURE — 3074F SYST BP LT 130 MM HG: CPT | Performed by: NURSE PRACTITIONER

## 2025-01-22 PROCEDURE — 3078F DIAST BP <80 MM HG: CPT | Performed by: NURSE PRACTITIONER

## 2025-01-22 PROCEDURE — 99213 OFFICE O/P EST LOW 20 MIN: CPT | Performed by: NURSE PRACTITIONER

## 2025-01-22 PROCEDURE — 1036F TOBACCO NON-USER: CPT | Performed by: NURSE PRACTITIONER

## 2025-01-22 ASSESSMENT — SLEEP AND FATIGUE QUESTIONNAIRES
HOW LIKELY ARE YOU TO NOD OFF OR FALL ASLEEP WHILE SITTING AND TALKING TO SOMEONE: WOULD NEVER DOZE
HOW LIKELY ARE YOU TO NOD OFF OR FALL ASLEEP WHILE LYING DOWN TO REST IN THE AFTERNOON WHEN CIRCUMSTANCES PERMIT: HIGH CHANCE OF DOZING
HOW LIKELY ARE YOU TO NOD OFF OR FALL ASLEEP IN A CAR, WHILE STOPPED FOR A FEW MINUTES IN TRAFFIC: SLIGHT CHANCE OF DOZING
ESS-CHAD TOTAL SCORE: 7
SITING INACTIVE IN A PUBLIC PLACE LIKE A CLASS ROOM OR A MOVIE THEATER: WOULD NEVER DOZE
HOW LIKELY ARE YOU TO NOD OFF OR FALL ASLEEP WHILE WATCHING TV: MODERATE CHANCE OF DOZING
HOW LIKELY ARE YOU TO NOD OFF OR FALL ASLEEP WHEN YOU ARE A PASSENGER IN A CAR FOR AN HOUR WITHOUT A BREAK: WOULD NEVER DOZE
HOW LIKELY ARE YOU TO NOD OFF OR FALL ASLEEP WHILE SITTING AND READING: SLIGHT CHANCE OF DOZING
HOW LIKELY ARE YOU TO NOD OFF OR FALL ASLEEP WHILE SITTING QUIETLY AFTER LUNCH WITHOUT ALCOHOL: WOULD NEVER DOZE

## 2025-01-22 ASSESSMENT — PATIENT HEALTH QUESTIONNAIRE - PHQ9
2. FEELING DOWN, DEPRESSED OR HOPELESS: NOT AT ALL
SUM OF ALL RESPONSES TO PHQ9 QUESTIONS 1 AND 2: 0
1. LITTLE INTEREST OR PLEASURE IN DOING THINGS: NOT AT ALL

## 2025-01-22 NOTE — PATIENT INSTRUCTIONS
[facility]  [address] [place] [address]       NAME: John Rojas   DATE: 01/22/25    Your Sleep Provider Today: LAUREN Mark  Your Primary Care Physician: Lori Haney MD       DIAGNOSIS:   1. Obstructive sleep apnea  Positive Airway Pressure (PAP) Therapy      2. Primary hypertension        3. Obesity (BMI 30-39.9)            Thank you for coming to the Sleep Medicine Clinic today! Your sleep medicine provider today was: LAUREN Mark Below is a summary of your treatment plan, other important information, and our contact numbers:      TREATMENT PLAN     - Follow-up in 3 months.  - If not already done, sign up for 'My Chart' and send prescription requests or messages through this  - New CPAP ordered through [employer] ([email or web address]): [phone]    Obstructive sleep apnea (URIAH): URIAH is a sleep disorder where your upper airway muscles relax during sleep and the airway intermittently and repetitively narrows and collapses leading to blocked airway (apnea) which, in turn, can disrupt breathing in sleep, lower oxygen levels while you sleep and cause night time wakings. Because apnea may cause higher carbon dioxide or low oxygen levels, untreated URIAH can lead to heart arrhythmia, elevation of blood pressure, and make it harder for the body to consolidate memory and metabolize (leading to higher blood sugars at night).   Frequent partial arousals occur during sleep resulting in sleep deprivation and daytime sleepiness. URIAH is associated with an increased risk of cardiovascular disease, stroke, hypertension, and insulin resistance. Moreover, untreated URIAH with excessive daytime sleepiness can increase the risk of motor vehicular accidents.    Some conservative strategies for URIAH are:   Positional therapy - Avoid sleeping on your back.   Healthy diet, exercise, and optimizing weight encouraged.  "  Avoid alcohol late in the evening as it can make sleep apnea worse.     Safety: Avoid driving and operating heavy equipment while sleepy. Drowsy driving may lead to life-threatening motor vehicle accidents.     Common treatment options for sleep apnea include weight management, positional therapy, Positive Airway Therapy (PAP) therapy, oral appliance therapy, hypoglossal nerve stimulation, and select airway surgeries.    Starting Positive Airway Pressure: You were ordered a device to wear when you sleep called PAP (Positive Airway Pressure) to treat your sleep apnea. The order will be submitted to a durable medical equipment company who will arrange setting you up with the device. They will provide all the necessary equipment and discuss use and maintenance of the device with you.     **Please bring all PAP equipment with you to follow up appointments unless told otherwise.**           Important things to keep in mind as you start PAP    Insurance will monitor your usage during the first 90 days.  You should use your PAP - \"all night, every night\", for your health. The bare minimum is to use your PAP device while sleeping = at least 4 hours per day at least 5 days per week. Otherwise, your PAP device may be reclaimed by your PAP vendor at 90 days.  There are many mask to choose from to wear with your PAP machine. If you are not comfortable with the first mask issued to you, call your DME and ask for another option to try (within the first 30 days).  Discuss with your provider if you are having issues breathing with the machine or the temperature or humidity feel uncomfortable.  Expect to have an adjustment period when you start your device. It helps to continuing wearing the machine every day for a period of time until you get more used to it. You can practice with wearing the mask alone if you need, then add in the PAP air pressure a few days later.   Reach out for help if you are struggling! The sleep medicine " department can be reached at [phone]  We encourage you to download data monitoring apps to your phone. For [employer] AirNumotese 10/11 - MyAir nury. For [employer] DreamStation - DreamMapper. Both are available in the [employer] store for free and are a great tool to monitor your progress with your CPAP night to night.    IF YOU ARE HAVING TROUBLE GETTING USED TO YOUR PAP DEVICE    The following steps are recommended to help you get accustomed to using CPAP and improve your CPAP usage at home:    Use CPAP every night for 5 to 10 minutes while awake in the evening without fail.  Be sure to remain awake while breathing on the nasal mask for the first 1 to 2 weeks.  After 2 to 4 weeks, start using CPAP at night when you go to sleep…But be sure to take the mask off if you have not fallen asleep in 5 to 10 minutes, or if you fall asleep.  Take the mask off whenever you become aware of it or the moment you wake up.   Continue this process every night, with confidence that you will gradually become accustomed to using CPAP over time.    Be sure you are using a comfortable mask, and that you are applying it snugly (but not too tight).    Common issues with PAP machine:  Mask gets dislodged when turning to the side: Consider getting a CPAP pillow or switching to a mask with hose on top.     Dry mouth:  Your machine has built-in humidifier that heats up the air to prevent dry mouth. It can be adjusted to your comfort. You can try that first and increase setting one level one night at a time to check which setting is comfortable and effective in lessening dry mouth. If dry mouth persists despite humidity setting adjustment, may apply OTC Biotene gel over the gums at bedtime.  If Biotene gel is not effective, consider trying XEROSTOM gel from [employer].  If using nasal pillows or nasal mask, consider adding chinstrap or mouth tape to keep your mouth closed while you sleep. Also, eliminate or reduce dose of meds that can cause dry mouth  "if possible. Lastly, may try getting a separate room humidifier machine.    Airleaks: Please call DME as they may need to adjust your mask or refit you with a different kind of mask. In addition, you can ask DME for tips on getting a good mask seal and mask fit.     Difficulty tolerating the mask: Contact your DME to try a different kind of mask and/or call office to get a referral to Sleep Psychologist for CPAP desensitization. CPAP desensitization technique is a set of strategies that helps patient cope with claustrophobia and anxiety related to wearing mask. Alternatively, we can do a daytime mini-sleep study called PAP-nap trial wherein you will try on different kinds of mask and the sleep technician will try different pressure settings on CPAP and BPAP machines to see which specific pressure is tolerable and comfortable for you.     Water droplets or moisture within the hose and/or mask: This is called rain-out and it is caused by condensation of too much heated humidity on the cooler walls of the hose. If you have rain-out, turn down/decrease your humidity setting or get a heated hose. If you already have a heated hose, turn up the \"tube temperature\" of the heated hose. Alternatively, if you don't want to get a heated hose or warmer air, may wrap the CPAP hose with stockings to keep it somewhat warm. Also, you need to place the machine on the floor and lower the hose so that water won't travel upward towards your mask.     Maintaining your CPAP/BPAP device:    The humidification chamber (aka water tank or water chamber) needs to be filled with distilled water to prevent buildup of white deposits in the future. If you cannot find distilled water, you can use tap water but expect to have white deposits buildup seen after prolonged use with tap water. If you start seeing white deposits on the water chamber, you can clean it by filling it with equal parts of distilled white vinegar and water. Let the vinegar-water " mixture sit for 2 hours, and then rinse it with running tap water. Clean with soap and water then let it dry.     You should try to keep your machine clean in order to work well. Here are some tips to clean PAP supplies / accessories:    Clean the humidification chamber (aka water tank) as well as your mask and tubing at least once a week with soap and water.   Alternatively, you can fill a sink or basin with warm water and add a little mild detergent, like Ivory dish soap. Gently wipe your supplies with the soapy water to free all the oils and dirt that may have collected. Once that's done, rinse these items with clean water until the soap is gone and let them air dry. You can hang your tubing over the curtain mihai in your bathroom so that it dries.  The mask insert (part of the mask that has contact with your skin) needs to be cleaned with soap and water daily. Another option is to wipe them down with CPAP wipes or baby wipes.    You should replace your mask and tubing frequently in order to prevent bacteria buildup, machine damage, and mask seal issues. The older the mask and hose, the high likelihood that there is bacteria buildup in it especially if they are not cleaned regularly. Dirty filters damage machines because build-up of dust and contaminants can cause machine to over-heat, and in time, damage the motor of machine. Cushions lose their seal over time as most masks are made of plastic and silicone while headgear is made of neoprene. These materials will break down with age and frequent use. Here is the recommended replacement schedule for PAP supplies / accessories:    Twice a month- disposable filters and cushions for nasal mask or nasal pillows.  Once a month- cushion for full face mask  Every 3 months- mask with headgear and PAP tubing (standard or heated hose)  Every 6 months- reusable filter, water chamber, and chin strap     Other useful information:    Some people do not put water in the tank while  other people prefers to put water in the tank to prevent mouth dryness. Try to experiment to determine which is more comfortable for you.   In general, new machines have 2 years warranty on parts while health insurance allows you to have a new machine once every 5 years.     You can also go to the following EDUCATION WEBSITES for further information:   American Academy of Sleep Medicine http://sleepeducation.org  National Sleep Foundation: https://sleepfoundation.org  American Sleep Apnea Association: https://www.sleepapnea.org (for patients with sleep apnea)    Here at [facility], we wish you a restful sleep!       IMPORTANT INFORMATION     Call 911 for medical emergencies.  Our offices are generally open from Monday-Friday, 9 am - 5 pm.  If you need to get in touch with me, you may either call me/my team (number is below) or you can use Written.  If a referral for a test, for CPAP, or for another specialist was made, and you have not heard about scheduling this within a week, please call scheduling at [phone] ([phone]).  If you are unable to make your appointment for clinic or an overnight study, kindly call the office at least 48 hours in advance to cancel and reschedule.  If you are on CPAP, please bring your device's card or the device to each clinic appointment.   There are no supporting services by either the sleep doctors or their staff on weekends and Holidays, or after 5 PM on weekdays.     PRESCRIPTIONS     We require 7 days advanced notice for prescription refills. If we do not receive the request in this time, we cannot guarantee that your medication will be refilled in time.    IMPORTANT PHONE NUMBERS     [facility]: [phone]  [employer] (DME): [phone]  [employer] (DME): [phone]  [facility] (DME): [phone]    CONTACTING YOUR SLEEP MEDICINE PROVIDER AND SLEEP TEAM      For issues with your machine or mask interface, please call  "your DME provider first. DME stands for durable medical company. DME is the company who provides you the machine and/or PAP supplies / accessories.   To schedule, cancel, or reschedule SLEEP STUDY APPOINTMENTS, please call the Main Phone Line at [phone] ([phone]) - option 3.   To schedule, cancel, or reschedule CLINIC APPOINTMENTS, you can do it in \"MyChart\", call [phone] for [facility] office to speak with my on site staff, or call the Main Phone Line at [phone] - option 2  For CLINICAL QUESTIONS or MEDICATION REFILLS, please call direct line for Adult Sleep Nurses at [phone].   Lastly, you can also send a message directly to your provider through \"My Chart\", which is the email service through your  Records Account: [email or web address]     Adult Sleep Nurses (Angelina Yu, AGA and Beryl Sullivan RN):  For clinical questions and refilling prescriptions: [phone]  Email sleep diaries and other documents at: [email or web address]    Office locations for Melissa Ernst NP:    [facility]   [address]   [address]  [place] [address] [phone]    [facility]  [address]  [address]  [place], [place] [address] [phone]    OUR SLEEP TESTING LOCATIONS     Our team will contact you to schedule your sleep study, however, you can contact us as follow:  Main Phone Line (scheduling only): [phone] ([phone]), option 3    Sleep Testing Locations:   [place] (18 years and older): [address] [place] [address], 2nd floor  [facility] (18 years and older): [address]; 4th floor  After hours line: [phone]  [facility] (18 years and older) at [place]: [address]  After hours line: [phone]   [facility] (Main campus: All ages): [address] [facility], 6th floor. After hours line: [phone]   [place] (5 years and older; younger considered on case-by-case basis): [phone]; " [address], [address]. Scheduling  After hours line: [phone]       Here at [facility], we wish you a restful sleep!    Your sleep medicine provider for this visit was: SHINE Mark-CNP

## 2025-01-24 DIAGNOSIS — E11.9 NEW ONSET TYPE 2 DIABETES MELLITUS (MULTI): ICD-10-CM

## 2025-01-24 RX ORDER — SEMAGLUTIDE 0.68 MG/ML
0.5 INJECTION, SOLUTION SUBCUTANEOUS WEEKLY
Qty: 3 ML | Refills: 2 | Status: SHIPPED | OUTPATIENT
Start: 2025-01-24

## 2025-02-04 DIAGNOSIS — E78.2 MIXED HYPERLIPIDEMIA: ICD-10-CM

## 2025-02-04 DIAGNOSIS — F41.9 ANXIETY: ICD-10-CM

## 2025-02-04 DIAGNOSIS — K21.9 GASTROESOPHAGEAL REFLUX DISEASE, UNSPECIFIED WHETHER ESOPHAGITIS PRESENT: ICD-10-CM

## 2025-02-04 DIAGNOSIS — I10 PRIMARY HYPERTENSION: ICD-10-CM

## 2025-02-04 DIAGNOSIS — M54.9 BACK PAIN, UNSPECIFIED BACK LOCATION, UNSPECIFIED BACK PAIN LATERALITY, UNSPECIFIED CHRONICITY: ICD-10-CM

## 2025-02-04 RX ORDER — LISINOPRIL 30 MG/1
30 TABLET ORAL DAILY
Qty: 90 TABLET | Refills: 1 | Status: SHIPPED | OUTPATIENT
Start: 2025-02-04 | End: 2025-08-03

## 2025-02-04 RX ORDER — DULOXETIN HYDROCHLORIDE 60 MG/1
60 CAPSULE, DELAYED RELEASE ORAL DAILY
Qty: 90 CAPSULE | Refills: 1 | Status: SHIPPED | OUTPATIENT
Start: 2025-02-04

## 2025-02-04 RX ORDER — ATORVASTATIN CALCIUM 80 MG/1
80 TABLET, FILM COATED ORAL DAILY
Qty: 90 TABLET | Refills: 1 | Status: SHIPPED | OUTPATIENT
Start: 2025-02-04 | End: 2026-03-11

## 2025-02-04 RX ORDER — CHLORTHALIDONE 50 MG/1
50 TABLET ORAL DAILY
Qty: 90 TABLET | Refills: 1 | Status: SHIPPED | OUTPATIENT
Start: 2025-02-04

## 2025-02-04 RX ORDER — CARVEDILOL 12.5 MG/1
12.5 TABLET ORAL
Qty: 180 TABLET | Refills: 1 | Status: SHIPPED | OUTPATIENT
Start: 2025-02-04

## 2025-02-04 RX ORDER — GABAPENTIN 600 MG/1
600 TABLET ORAL 3 TIMES DAILY
Qty: 270 TABLET | Refills: 1 | Status: SHIPPED | OUTPATIENT
Start: 2025-02-04

## 2025-02-04 RX ORDER — DULOXETIN HYDROCHLORIDE 30 MG/1
30 CAPSULE, DELAYED RELEASE ORAL EVERY EVENING
Qty: 30 CAPSULE | Refills: 5 | Status: SHIPPED | OUTPATIENT
Start: 2025-02-04 | End: 2025-08-03

## 2025-02-04 RX ORDER — FAMOTIDINE 20 MG/1
20 TABLET, FILM COATED ORAL 2 TIMES DAILY
Qty: 180 TABLET | Refills: 1 | Status: SHIPPED | OUTPATIENT
Start: 2025-02-04

## 2025-02-04 NOTE — TELEPHONE ENCOUNTER
LOV: 12/19/24  NOV: 4/22/25    Stated all meds need refilled through [facility].

## 2025-02-06 ENCOUNTER — PATIENT MESSAGE (OUTPATIENT)
Dept: PRIMARY CARE | Facility: CLINIC | Age: 65
End: 2025-02-06
Payer: COMMERCIAL

## 2025-04-02 DIAGNOSIS — E11.9 NEW ONSET TYPE 2 DIABETES MELLITUS (MULTI): ICD-10-CM

## 2025-04-02 DIAGNOSIS — M54.9 BACK PAIN, UNSPECIFIED BACK LOCATION, UNSPECIFIED BACK PAIN LATERALITY, UNSPECIFIED CHRONICITY: ICD-10-CM

## 2025-04-03 RX ORDER — GABAPENTIN 600 MG/1
600 TABLET ORAL 3 TIMES DAILY
Qty: 270 TABLET | Refills: 1 | Status: SHIPPED | OUTPATIENT
Start: 2025-04-03

## 2025-04-03 RX ORDER — SEMAGLUTIDE 0.68 MG/ML
INJECTION, SOLUTION SUBCUTANEOUS
Qty: 9 ML | Refills: 0 | Status: SHIPPED | OUTPATIENT
Start: 2025-04-03

## 2025-04-17 NOTE — PROGRESS NOTES
Patient: John Rojas  : 1960 AGE: 64 y.o. SEX:male   MRN: [id number]   Provider: LAUREN Mark     Location [facility]   Service Date: 2025     PCP:    Referred by: No ref. provider found          [facility]  Follow Up Visit Note      HISTORY OF PRESENT ILLNESS     John Rojas is a 64 y.o. male with a h/o Hypertension, Obesity, Depression, Anxiety, Diabetes, CKD, NAFLD, neuropathy, HLD who presents to [facility].    25: First follow up since RePAP. Reports doing very well with PAP therapy. Reports he is comfortable with current pressure and FFM mask fit. Denies any bothersome symptoms or air leak. Denies any nasal symptoms on CPAP including rhinorrhea, nasal congestion, postnasal drip or sinusitis. Reports no snoring and much more refreshing sleep in comparison to before starting APAP.  Cleaning equipment regularly. ----> supplies ordered     25: After sleep study was completed, APAP was ordered through [facility] in November but patient did not hear from DME about his replacement machine. Brought REM Star M Serious machine to office visit today; set on CPAP 10CWP, machine very old. Uses every night with positive benefit. ---> RePAP 7-15 CWP via [facility]     10/21/24: NPV with concerns of URIAH management. On CPAP, still wakes tired. Reports he was dx with URIAH about 20years ago and has been on CPAP since. His last sleep study was performed at [facility] >10 years ago (no diagnostic results available for review today). Current machine is 10 years old and needs replacement. Unsure of pressure settings. Uses PAP every night, difficulty sleeping without it. He does not use humidifier. Comfortable with FFM- he has been purchases supplies from a CPAP store. The following are patient's perceived benefits of PAP: decreased snoring / choking / gasping, refreshing sleep, decreased daytime sleepiness and/or fatigue, sleeps  Is a chronic health problem for this patient for which she is on lisinopril 40 mg daily.  His blood pressure is excellent 120/70.  He has his BMI down to 26.3.  He will continue to work on that.   faster at bedtime, decreased nocturnal awakening, and better quality of sleep. Has gained 20 lbs since last sleep study. ---> Split PSG ordered       SLEEP STUDY HISTORY (personally reviewed raw data such as interpretation report, data sheet, hypnogram, and titration table if available and applicable)  --Split PSG 11/12/2024-showing severe URIAH with RDI 3% 67.9, RDI 4% 60.7, MARILYNN 0.0, SpO2 martha 77% REM sleep without atonia was noted this finding was not associated with vocalization or with abnormal motor behavior during the sleep study.  Clinical correlation is recommended.  Frequent periodic limb movements of sleep were noted with a PLM index of 37.9/hour. Consider APAP 5-15 cm H2O with EPR off via a ResMed AirFit F20 fullface mask size medium.    ESS: 8    REVIEW OF SYSTEMS     All other systems have been reviewed and are negative.    ALLERGIES     Allergies   Allergen Reactions    Peanut Unknown       MEDICATIONS     Current Outpatient Medications   Medication Sig Dispense Refill    aspirin 81 mg EC tablet Take 1 tablet (81 mg) by mouth once daily.      atorvastatin (Lipitor) 80 mg tablet Take 1 tablet (80 mg) by mouth once daily. 90 tablet 1    betamethasone valerate (Valisone) 0.1 % cream twice a day.      carvedilol (Coreg) 12.5 mg tablet Take 1 tablet (12.5 mg) by mouth 2 times daily (morning and late afternoon). 180 tablet 1    cetirizine (ZyrTEC) 10 mg tablet Take 1 tablet (10 mg) by mouth once daily.      chlorthalidone (Hygroton) 50 mg tablet Take 1 tablet (50 mg) by mouth once daily. 90 tablet 1    DULoxetine (Cymbalta) 30 mg DR capsule Take 1 capsule (30 mg) by mouth once daily in the evening. Do not crush or chew. 30 capsule 5    DULoxetine (Cymbalta) 60 mg DR capsule Take 1 capsule (60 mg) by mouth once daily. 90 capsule 1    famotidine (Pepcid) 20 mg tablet Take 1 tablet (20 mg) by mouth 2 times a day. 180 tablet 1    FreeStyle Jerome 3 Sensor device Use as Directed 2 each 3    gabapentin (Neurontin)  "600 mg tablet TAKE 1 TABLET 3 TIMES A  tablet 1    lisinopril 30 mg tablet Take 1 tablet (30 mg) by mouth once daily. 90 tablet 1    multivitamin with minerals tablet Take 1 tablet by mouth once daily.      Ozempic 0.25 mg or 0.5 mg (2 mg/3 mL) pen injector INJECT 0.5MG SUBCUTANEOUSLYONCE A WEEK 9 mL 0     No current facility-administered medications for this visit.       PAST HISTORIES     PERTINENT PAST MEDICAL HISTORY: See HPI    PERTINENT PAST SURGICAL HISTORY for Sleep Medicine:  non-contributory    PERTINENT FAMILY HISTORY for Sleep Medicine:  loud snoring- mother and father     PERTINENT SOCIAL HISTORY:  He  reports that he has never smoked. He has never been exposed to tobacco smoke. He has never used smokeless tobacco. He reports that he does not currently use alcohol. He reports that he does not use drugs. He currently lives with spouse.    Active Problems, Allergy List, Medication List, and PMH/PSH/FH/Social Hx have been reviewed and reconciled in chart. No significant changes unless documented in the pertinent chart section. Updates made when necessary.     PHYSICAL EXAM     VITAL SIGNS: /63   Pulse 56   Resp 18   Ht 1.905 m (6' 3\")   Wt 125 kg (275 lb 12.8 oz)   SpO2 93%   BMI 34.47 kg/m²     CURRENT WEIGHT:   Vitals:    04/24/25 0847   Weight: 125 kg (275 lb 12.8 oz)       PREVIOUS WEIGHTS:  Wt Readings from Last 3 Encounters:   04/24/25 125 kg (275 lb 12.8 oz)   04/22/25 126 kg (278 lb)   01/22/25 123 kg (272 lb)     Constitutional: Alert and oriented, cooperative, no obvious distress   HEENT: Non icteric or anemic, EOM WNL bilaterally   Neck: Supple, no JVD, no goiter, no adenopathy, no rigidity     RESULTS/DATA     No results found for: \"IRON\", \"TRANSFERRIN\", \"IRONSAT\", \"TIBC\", \"FERRITIN\"    CARBON DIOXIDE   Date Value Ref Range Status   04/22/2025 33 (H) 20 - 32 mmol/L Final       ASSESSMENT/PLAN     Mr. Rojas is a 64 y.o. male and He was referred to the [facility] " Medicine [facility] for evaluation of URIAH    Problem List, Orders, Assessment, Recommendations:    # URIAH, severe  -Split PSG 11/12/2024-showing severe URIAH with RDI 3% 67.9, RDI 4% 60.7, MARILYNN 0.0, SpO2 martha 77% REM sleep without atonia was noted this finding was not associated with vocalization or with abnormal motor behavior during the sleep study.  Clinical correlation is recommended.  Frequent periodic limb movements of sleep were noted with a PLM index of 37.9/hour. Consider APAP 5-15 cm H2O with EPR off via a ResMed AirFit F20 fullface mask size medium.    4/24/25:  - Retrieved and personally reviewed recent PAP adherence download data today. See HPI.  - excellent compliance to PAP therapy, residual AHI at goal, and good control of URIAH symptoms  - continue current setting 7-15 CWP  - renew PAP supply orders, order placed to [facility]  - diet, exercise, and weight loss were emphasized today in clinic, as were non-supine sleep, avoiding alcohol in the late evening, and driving or operating heavy machinery when sleepy. Patient verbalized understanding.     1/22/25:  - Brought REM Star M Serious machine to office visit today; set on CPAP 10CWP, machine very old and needs replacement  - APAP ordered but has not been set up through [facility] as previously ordered  - Will switch to DME- [facility] RePAP 7-15 CWP     10/21/24:  - Remote hx, no diagnostic available for review; states he had mixed sleep apnea   - On CPAP for past 20 years, current machine very old, need replacement  - Obtain Split PSG, will call with results and order PAP via [facility] (pt request)   -Sleep apnea, PAP therapy education as well as the tips to be successful with PAP treatment was provided at length in clinic today. Patient verbalized understanding.  - Discussed 30-day mask guarantee and insurance requirement regarding PAP compliance and follow-up.   -Diet, exercise, and weight loss were emphasized today in clinic, as were non-supine sleep, avoiding  alcohol in the late evening, and driving or operating heavy machinery when sleepy.   - patient will follow-up in 2-3 months and bring equipment to the follow-up clinic      #HTN  BP Readings from Last 1 Encounters:   04/24/25 101/63     - doing well, asymptomatic, denies any headache, blurry vision, chest pain, palpitation, dizziness, lightheadedness, or syncopal episodes  - discussed at length the impact of untreated URIAH and BP control  - supportive management: low salt DASH diet (less than 2000 mg sodium intake daily), moderate intensity aerobic exercise at least 30 minutes 5 days per week, reduce stress, quit smoking, limit alcohol, lose weight, and monitor BP once daily  - continue current management and follow-up with PCP     #Obesity  BMI Readings from Last 1 Encounters:   04/24/25 34.47 kg/m²     - Encouraged healthy weight loss via diet and exercise  - Weight loss can help in the long term treatment of URIAH.  - Defer management to PCP     All of patient's questions were answered. He verbalizes understanding and agreement with my assessment and plan.    Disposition    Return to clinic in 12 months or sooner if needed

## 2025-04-22 ENCOUNTER — APPOINTMENT (OUTPATIENT)
Dept: PRIMARY CARE | Facility: CLINIC | Age: 65
End: 2025-04-22
Payer: COMMERCIAL

## 2025-04-22 VITALS
HEIGHT: 75 IN | HEART RATE: 68 BPM | DIASTOLIC BLOOD PRESSURE: 72 MMHG | OXYGEN SATURATION: 95 % | BODY MASS INDEX: 34.57 KG/M2 | WEIGHT: 278 LBS | SYSTOLIC BLOOD PRESSURE: 122 MMHG

## 2025-04-22 DIAGNOSIS — E11.9 TYPE 2 DIABETES MELLITUS WITHOUT COMPLICATION, WITHOUT LONG-TERM CURRENT USE OF INSULIN: ICD-10-CM

## 2025-04-22 DIAGNOSIS — E78.2 MIXED HYPERLIPIDEMIA: ICD-10-CM

## 2025-04-22 DIAGNOSIS — Z00.00 ANNUAL PHYSICAL EXAM: Primary | ICD-10-CM

## 2025-04-22 DIAGNOSIS — Z12.5 PROSTATE CANCER SCREENING: ICD-10-CM

## 2025-04-22 DIAGNOSIS — N18.9 CHRONIC KIDNEY DISEASE, UNSPECIFIED CKD STAGE: ICD-10-CM

## 2025-04-22 DIAGNOSIS — I10 PRIMARY HYPERTENSION: ICD-10-CM

## 2025-04-22 LAB — POC HEMOGLOBIN A1C: 7.1 % (ref 4.2–6.5)

## 2025-04-22 PROCEDURE — 99214 OFFICE O/P EST MOD 30 MIN: CPT | Performed by: STUDENT IN AN ORGANIZED HEALTH CARE EDUCATION/TRAINING PROGRAM

## 2025-04-22 PROCEDURE — 99396 PREV VISIT EST AGE 40-64: CPT | Performed by: STUDENT IN AN ORGANIZED HEALTH CARE EDUCATION/TRAINING PROGRAM

## 2025-04-22 PROCEDURE — 83036 HEMOGLOBIN GLYCOSYLATED A1C: CPT | Performed by: STUDENT IN AN ORGANIZED HEALTH CARE EDUCATION/TRAINING PROGRAM

## 2025-04-22 PROCEDURE — 3008F BODY MASS INDEX DOCD: CPT | Performed by: STUDENT IN AN ORGANIZED HEALTH CARE EDUCATION/TRAINING PROGRAM

## 2025-04-22 PROCEDURE — 3078F DIAST BP <80 MM HG: CPT | Performed by: STUDENT IN AN ORGANIZED HEALTH CARE EDUCATION/TRAINING PROGRAM

## 2025-04-22 PROCEDURE — 1036F TOBACCO NON-USER: CPT | Performed by: STUDENT IN AN ORGANIZED HEALTH CARE EDUCATION/TRAINING PROGRAM

## 2025-04-22 PROCEDURE — 3074F SYST BP LT 130 MM HG: CPT | Performed by: STUDENT IN AN ORGANIZED HEALTH CARE EDUCATION/TRAINING PROGRAM

## 2025-04-22 PROCEDURE — 3051F HG A1C>EQUAL 7.0%<8.0%: CPT | Performed by: STUDENT IN AN ORGANIZED HEALTH CARE EDUCATION/TRAINING PROGRAM

## 2025-04-22 PROCEDURE — 4010F ACE/ARB THERAPY RXD/TAKEN: CPT | Performed by: STUDENT IN AN ORGANIZED HEALTH CARE EDUCATION/TRAINING PROGRAM

## 2025-04-22 NOTE — PROGRESS NOTES
Subjective   Patient ID: John Rojas is a 64 y.o. male who presents for the follow up    Assessment/Plan   Preventative Medicine (April 2025)  -UTD On vaccines   -Colonoscopy: done in July 2024. Repeat in 2029  -PSA reviewed 0.49 (April 2024). Repeat today   -PHQ2: 0  -Alcohol screening done     #Type II Diabetes   -A1c: 7.2 > 6.7% > 7.1% > 7.1%  -Discussed various options with patient. Diabetic education given  PLAN  -Continue 0.5mg ozempic once weekly.   -Had some side effects but is willing to try again.   -Continue BG monitoring at home  -Check labs  -No diabetic foot wounds/ulcers     #HLD  -Atorvastatin 80mg   -Low fat diet     #HTN, essential  -BP stable  -Continue low salt diet   -Continue chlorthalidone and lisinopril     #Neuropathy  #Lumbar Radiculopathy  -Stable   -Continue gabapentin      #YUDY/MDD  -Improving  PLAN  -Continue cymbalta 60mg PO qAM and 30mg PO qPM     #CKD stage IIIa  -Check CMP and UACR   -Avoid nephrotoxic agents most concerning given patients complaint of chronic back pain educated on the avoidance of NSAIDs  -Continue lisinopril 30mg PO daily  -Consider starting farxiga based on labs      #NAFLD  -Confirmed by liver bx in 2021   -Low fat diet recommended   -Exercise as tolerated   -Continue ozempic       #URIAH  -Doing okay with CPAP  -Is following with sleep clinic     #Orthostasis - resolved   -Resolved after decreasing BP meds    #Acid Reflux   -Well controlled on famotidine   -Refer to GI if symptoms dont resume  -Discontinued zanaflex       HPI     64-year-old male presents for annual physical and follow-up    General health wise he is doing well.  No acute complaints    He is currently on Ozempic 0.5 mg subcu weekly for treatment of diabetes.  States that he has had some decrease in appetite and nausea associated with it and when this happens he decreases his dose to 0.25 for 1 week.  Discussed that we can discontinue Ozempic and switch to oral diabetes medication such as  "metformin but patient would like to continue with Ozempic at this time.  No diabetic foot complications or nonhealing wounds reported.    Discussed chronic medical conditions such as neuropathy and lumbar radiculopathy which are both well-controlled with gabapentin.    He also has a history of acid reflux which is well-controlled with discontinuation of Zanaflex and initiation on famotidine.    Blood pressure is well-controlled    Attempting to eat low-fat, low-carb diet.    Denies fevers, chills, weight loss, lightheadedness, dizziness, vision changes, sore throat, runny nose, CP, SOB, cough, palpitations, n/v/d, abd pain, black/bloody stools, arthralgias, mood disturbance, or new numbness/weakness/tingling in arms/legs/face.      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 NCAT. Aox3   HEENT: PERRLA. EOMI. MMM. Nares patent bl.  Cardiovascular: RRR. No MRG. S1/S2 wnl.   Respiratory: CTABL. No acute respiratory distress.   GI: Soft, NT abdomen.   MSK: ROM x 4. Chronic lumbar paraspinal tenderness.   Extremities: BLLE trace non-pitting edema. Cap refill < 2 sec.   Skin: No rashes or bruises.   Neuro: Aox3. Cranial Nerves grossly intact. Motor/sensory wnl. Chronic BLLE neuropathy.   Psych: Mood wnl.       REVIEW OF SYSTEMS   ROS In HPI     Allergies   Allergen Reactions    Peanut Unknown       Current " Outpatient Medications   Medication Sig Dispense Refill    aspirin 81 mg EC tablet Take 1 tablet (81 mg) by mouth once daily.      atorvastatin (Lipitor) 80 mg tablet Take 1 tablet (80 mg) by mouth once daily. 90 tablet 1    betamethasone valerate (Valisone) 0.1 % cream twice a day.      carvedilol (Coreg) 12.5 mg tablet Take 1 tablet (12.5 mg) by mouth 2 times daily (morning and late afternoon). 180 tablet 1    cetirizine (ZyrTEC) 10 mg tablet Take 1 tablet (10 mg) by mouth once daily.      chlorthalidone (Hygroton) 50 mg tablet Take 1 tablet (50 mg) by mouth once daily. 90 tablet 1    DULoxetine (Cymbalta) 30 mg DR capsule Take 1 capsule (30 mg) by mouth once daily in the evening. Do not crush or chew. 30 capsule 5    DULoxetine (Cymbalta) 60 mg DR capsule Take 1 capsule (60 mg) by mouth once daily. 90 capsule 1    famotidine (Pepcid) 20 mg tablet Take 1 tablet (20 mg) by mouth 2 times a day. 180 tablet 1    FreeStyle Jerome 3 Sensor device Use as Directed 2 each 3    gabapentin (Neurontin) 600 mg tablet TAKE 1 TABLET 3 TIMES A  tablet 1    lisinopril 30 mg tablet Take 1 tablet (30 mg) by mouth once daily. 90 tablet 1    multivitamin with minerals tablet Take 1 tablet by mouth once daily.      Ozempic 0.25 mg or 0.5 mg (2 mg/3 mL) pen injector INJECT 0.5MG SUBCUTANEOUSLYONCE A WEEK 9 mL 0     No current facility-administered medications for this visit.       Objective     Office Visit on 04/22/2025   Component Date Value Ref Range Status    POC HEMOGLOBIN A1c 04/22/2025 7.1 (A)  4.2 - 6.5 % Final   Orders Only on 12/31/2024   Component Date Value Ref Range Status    [facility] Calcium 12/31/2024 10.2  8.7 - 10.4 mg/dL Final    [facility] A/G Ratio 12/31/2024 1.3   Final    [facility] BUN 12/31/2024 19  9 - 23 mg/dL Final    [facility] Total Protein 12/31/2024 7.5  5.7 - 8.2 g/dL Final    [facility] GPT 12/31/2024 60 (H)  10 - 49 unit/L Final    [facility] Glucose 12/31/2024 132 (H)  74 - 106 mg/dL Final     [facility] ALB 12/31/2024 4.3  3.4 - 5.0 g/dL Final    [facility] Bilirubin, Total 12/31/2024 1.90 (H)  0.30 - 1.20 mg/dL Final    [facility] Chloride 12/31/2024 103  98 - 107 mmol/L Final    [facility] BUN/Creat Ratio 12/31/2024 13.6   Final    [facility] Na 12/31/2024 139  135 - 145 mmol/L Final    [facility] Glomerular Filtration R* 12/31/2024 51  mL/min/1.73m? Final    [facility] Creatinine 12/31/2024 1.4 (H)  0.6 - 1.1 mg/dL Final    [facility] Globulin 12/31/2024 3.2  g/dL Final    [facility] Alk Phos 12/31/2024 79  45 - 117 unit/L Final    [facility] CO2, venous 12/31/2024 30.0  20.0 - 31.0 mmol/L Final    [facility] GOT 12/31/2024 46 (H)  15 - 37 unit/L Final    [facility] Calculated Osmolality 12/31/2024 282  275 - 295 mOsm/kg Final    [facility] K 12/31/2024 3.6  3.5 - 5.1 mmol/L Final    [facility] GFR AA 12/31/2024 >60   Final    [facility] Microalbumin/Creatinine* 12/31/2024 3  0 - 30 mg MALB/gm CREAT Final    [facility] Creatinine, Urine mg/dl 12/31/2024 301.1  mg/dL Final    [facility] Microalbumin, Urine mg/L 12/31/2024 10.0  mg/L Final       Radiology: Reviewed imaging in powerchart.  No results found.    Family History   Problem Relation Name Age of Onset    No Known Problems Mother       Social History     Socioeconomic History    Marital status:    Tobacco Use    Smoking status: Never     Passive exposure: Never    Smokeless tobacco: Never   Vaping Use    Vaping status: Never Used   Substance and Sexual Activity    Alcohol use: Not Currently     Comment: RARE    Drug use: Never     History reviewed. No pertinent past medical history.  Past Surgical History:   Procedure Laterality Date    OTHER SURGICAL HISTORY  12/06/2021    Gallbladder surgery    OTHER SURGICAL HISTORY  12/06/2021    Colonoscopy       Charting was completed using voice recognition technology and may include unintended errors.

## 2025-04-23 ENCOUNTER — TELEPHONE (OUTPATIENT)
Dept: PRIMARY CARE | Facility: CLINIC | Age: 65
End: 2025-04-23
Payer: COMMERCIAL

## 2025-04-23 DIAGNOSIS — E78.1 HIGH TRIGLYCERIDES: ICD-10-CM

## 2025-04-23 LAB
ALBUMIN SERPL-MCNC: 4.6 G/DL (ref 3.6–5.1)
ALBUMIN/CREAT UR: 7 MG/G CREAT
ALP SERPL-CCNC: 71 U/L (ref 35–144)
ALT SERPL-CCNC: 42 U/L (ref 9–46)
ANION GAP SERPL CALCULATED.4IONS-SCNC: 7 MMOL/L (CALC) (ref 7–17)
AST SERPL-CCNC: 35 U/L (ref 10–35)
BASOPHILS # BLD AUTO: 39 CELLS/UL (ref 0–200)
BASOPHILS NFR BLD AUTO: 0.5 %
BILIRUB SERPL-MCNC: 1.3 MG/DL (ref 0.2–1.2)
BUN SERPL-MCNC: 21 MG/DL (ref 7–25)
CALCIUM SERPL-MCNC: 9.3 MG/DL (ref 8.6–10.3)
CHLORIDE SERPL-SCNC: 101 MMOL/L (ref 98–110)
CHOLEST SERPL-MCNC: 121 MG/DL
CHOLEST/HDLC SERPL: 4.3 (CALC)
CO2 SERPL-SCNC: 33 MMOL/L (ref 20–32)
CREAT SERPL-MCNC: 1.24 MG/DL (ref 0.7–1.35)
CREAT UR-MCNC: 349 MG/DL (ref 20–320)
EGFRCR SERPLBLD CKD-EPI 2021: 66 ML/MIN/1.73M2
EOSINOPHIL # BLD AUTO: 203 CELLS/UL (ref 15–500)
EOSINOPHIL NFR BLD AUTO: 2.6 %
ERYTHROCYTE [DISTWIDTH] IN BLOOD BY AUTOMATED COUNT: 13 % (ref 11–15)
GLUCOSE SERPL-MCNC: 123 MG/DL (ref 65–139)
HCT VFR BLD AUTO: 44.5 % (ref 38.5–50)
HDLC SERPL-MCNC: 28 MG/DL
HGB BLD-MCNC: 15 G/DL (ref 13.2–17.1)
LDLC SERPL CALC-MCNC: ABNORMAL MG/DL
LYMPHOCYTES # BLD AUTO: 3572 CELLS/UL (ref 850–3900)
LYMPHOCYTES NFR BLD AUTO: 45.8 %
MCH RBC QN AUTO: 28.9 PG (ref 27–33)
MCHC RBC AUTO-ENTMCNC: 33.7 G/DL (ref 32–36)
MCV RBC AUTO: 85.7 FL (ref 80–100)
MICROALBUMIN UR-MCNC: 2.4 MG/DL
MONOCYTES # BLD AUTO: 585 CELLS/UL (ref 200–950)
MONOCYTES NFR BLD AUTO: 7.5 %
NEUTROPHILS # BLD AUTO: 3401 CELLS/UL (ref 1500–7800)
NEUTROPHILS NFR BLD AUTO: 43.6 %
NONHDLC SERPL-MCNC: 93 MG/DL (CALC)
PLATELET # BLD AUTO: 220 THOUSAND/UL (ref 140–400)
PMV BLD REES-ECKER: 10 FL (ref 7.5–12.5)
POTASSIUM SERPL-SCNC: 3.6 MMOL/L (ref 3.5–5.3)
PROT SERPL-MCNC: 7 G/DL (ref 6.1–8.1)
PSA SERPL-MCNC: 0.5 NG/ML
RBC # BLD AUTO: 5.19 MILLION/UL (ref 4.2–5.8)
SODIUM SERPL-SCNC: 141 MMOL/L (ref 135–146)
TRIGL SERPL-MCNC: 585 MG/DL
TSH SERPL-ACNC: 2.26 MIU/L (ref 0.4–4.5)
WBC # BLD AUTO: 7.8 THOUSAND/UL (ref 3.8–10.8)

## 2025-04-23 NOTE — TELEPHONE ENCOUNTER
----- Message from Lori Haney sent at 4/23/2025  9:49 AM EDT -----  Extremely high TG. Recommend avoiding alcohol and fried/fatty foods and repeat Lipid panel in 1 month.     ----- Message -----  From: Josey Calvo MA  Sent: 4/22/2025   1:53 PM EDT  To: Lori Haney MD

## 2025-04-24 ENCOUNTER — APPOINTMENT (OUTPATIENT)
Facility: CLINIC | Age: 65
End: 2025-04-24
Payer: COMMERCIAL

## 2025-04-24 VITALS
RESPIRATION RATE: 18 BRPM | WEIGHT: 275.8 LBS | SYSTOLIC BLOOD PRESSURE: 101 MMHG | OXYGEN SATURATION: 93 % | HEART RATE: 56 BPM | BODY MASS INDEX: 34.29 KG/M2 | HEIGHT: 75 IN | DIASTOLIC BLOOD PRESSURE: 63 MMHG

## 2025-04-24 DIAGNOSIS — E66.9 OBESITY (BMI 30-39.9): ICD-10-CM

## 2025-04-24 DIAGNOSIS — G47.33 OBSTRUCTIVE SLEEP APNEA: Primary | ICD-10-CM

## 2025-04-24 DIAGNOSIS — I10 PRIMARY HYPERTENSION: ICD-10-CM

## 2025-04-24 PROCEDURE — 3074F SYST BP LT 130 MM HG: CPT | Performed by: NURSE PRACTITIONER

## 2025-04-24 PROCEDURE — 1036F TOBACCO NON-USER: CPT | Performed by: NURSE PRACTITIONER

## 2025-04-24 PROCEDURE — 3008F BODY MASS INDEX DOCD: CPT | Performed by: NURSE PRACTITIONER

## 2025-04-24 PROCEDURE — 3078F DIAST BP <80 MM HG: CPT | Performed by: NURSE PRACTITIONER

## 2025-04-24 PROCEDURE — 99214 OFFICE O/P EST MOD 30 MIN: CPT | Performed by: NURSE PRACTITIONER

## 2025-04-24 ASSESSMENT — SLEEP AND FATIGUE QUESTIONNAIRES
HOW LIKELY ARE YOU TO NOD OFF OR FALL ASLEEP IN A CAR, WHILE STOPPED FOR A FEW MINUTES IN TRAFFIC: SLIGHT CHANCE OF DOZING
ESS-CHAD TOTAL SCORE: 8
HOW LIKELY ARE YOU TO NOD OFF OR FALL ASLEEP WHILE SITTING AND READING: SLIGHT CHANCE OF DOZING
HOW LIKELY ARE YOU TO NOD OFF OR FALL ASLEEP WHEN YOU ARE A PASSENGER IN A CAR FOR AN HOUR WITHOUT A BREAK: WOULD NEVER DOZE
HOW LIKELY ARE YOU TO NOD OFF OR FALL ASLEEP WHILE WATCHING TV: MODERATE CHANCE OF DOZING
HOW LIKELY ARE YOU TO NOD OFF OR FALL ASLEEP WHILE SITTING AND TALKING TO SOMEONE: WOULD NEVER DOZE
HOW LIKELY ARE YOU TO NOD OFF OR FALL ASLEEP WHILE SITTING QUIETLY AFTER LUNCH WITHOUT ALCOHOL: WOULD NEVER DOZE
HOW LIKELY ARE YOU TO NOD OFF OR FALL ASLEEP WHILE LYING DOWN TO REST IN THE AFTERNOON WHEN CIRCUMSTANCES PERMIT: HIGH CHANCE OF DOZING
SITING INACTIVE IN A PUBLIC PLACE LIKE A CLASS ROOM OR A MOVIE THEATER: SLIGHT CHANCE OF DOZING

## 2025-04-24 ASSESSMENT — ENCOUNTER SYMPTOMS
DEPRESSION: 0
LOSS OF SENSATION IN FEET: 1
OCCASIONAL FEELINGS OF UNSTEADINESS: 0

## 2025-05-23 DIAGNOSIS — E78.1 HIGH TRIGLYCERIDES: ICD-10-CM

## 2025-05-30 ENCOUNTER — TELEPHONE (OUTPATIENT)
Dept: PRIMARY CARE | Facility: CLINIC | Age: 65
End: 2025-05-30
Payer: COMMERCIAL

## 2025-05-30 NOTE — TELEPHONE ENCOUNTER
CALL FROM PT WIFE. PT IS AT WORK. HE IS HAVING RADIATING PAIN FROM SHOULDER DOWN TO HIS CHEST. I DID ASK IF HE NEEDS TO GO TO ER AND WIFE DECLINED. STATES THIS HAS BEEN GOING ON FOR AWHILE AND NEEDS APPT. PLEASE ADVISE

## 2025-05-30 NOTE — TELEPHONE ENCOUNTER
Informed wife that patient should go to ER due to pain radiating from chest to shoulder. She expressed understanding and will let the patient know.

## 2025-08-26 DIAGNOSIS — I10 PRIMARY HYPERTENSION: ICD-10-CM

## 2025-08-26 DIAGNOSIS — E78.2 MIXED HYPERLIPIDEMIA: ICD-10-CM

## 2025-08-26 DIAGNOSIS — F41.9 ANXIETY: ICD-10-CM

## 2025-08-26 RX ORDER — ATORVASTATIN CALCIUM 80 MG/1
80 TABLET, FILM COATED ORAL DAILY
Qty: 90 TABLET | Refills: 1 | Status: SHIPPED | OUTPATIENT
Start: 2025-08-26

## 2025-08-26 RX ORDER — CHLORTHALIDONE 50 MG/1
50 TABLET ORAL DAILY
Qty: 90 TABLET | Refills: 1 | Status: SHIPPED | OUTPATIENT
Start: 2025-08-26

## 2025-08-26 RX ORDER — DULOXETIN HYDROCHLORIDE 30 MG/1
CAPSULE, DELAYED RELEASE ORAL
Qty: 90 CAPSULE | Refills: 1 | Status: SHIPPED | OUTPATIENT
Start: 2025-08-26

## 2025-08-26 RX ORDER — LISINOPRIL 30 MG/1
30 TABLET ORAL DAILY
Qty: 90 TABLET | Refills: 1 | Status: SHIPPED | OUTPATIENT
Start: 2025-08-26

## 2025-08-26 RX ORDER — DULOXETIN HYDROCHLORIDE 60 MG/1
60 CAPSULE, DELAYED RELEASE ORAL DAILY
Qty: 90 CAPSULE | Refills: 1 | Status: SHIPPED | OUTPATIENT
Start: 2025-08-26

## 2025-10-22 ENCOUNTER — APPOINTMENT (OUTPATIENT)
Dept: PRIMARY CARE | Facility: CLINIC | Age: 65
End: 2025-10-22
Payer: COMMERCIAL

## 2026-04-28 ENCOUNTER — APPOINTMENT (OUTPATIENT)
Facility: CLINIC | Age: 66
End: 2026-04-28
Payer: COMMERCIAL